# Patient Record
Sex: FEMALE | Race: WHITE | Employment: OTHER | URBAN - METROPOLITAN AREA
[De-identification: names, ages, dates, MRNs, and addresses within clinical notes are randomized per-mention and may not be internally consistent; named-entity substitution may affect disease eponyms.]

---

## 2020-01-29 ENCOUNTER — HOSPITAL ENCOUNTER (OUTPATIENT)
Age: 85
Discharge: HOME | End: 2020-01-31
Payer: MEDICARE

## 2020-01-29 ENCOUNTER — HOSPITAL ENCOUNTER (OUTPATIENT)
Dept: GENERAL RADIOLOGY | Age: 85
Discharge: HOME | End: 2020-01-31
Payer: MEDICARE

## 2020-01-29 PROCEDURE — 72100 X-RAY EXAM L-S SPINE 2/3 VWS: CPT

## 2022-10-17 ENCOUNTER — HOSPITAL ENCOUNTER (OUTPATIENT)
Dept: GENERAL RADIOLOGY | Age: 87
Discharge: HOME | End: 2022-10-19
Payer: MEDICARE

## 2022-10-17 ENCOUNTER — HOSPITAL ENCOUNTER (OUTPATIENT)
Age: 87
Discharge: HOME | End: 2022-10-19
Payer: MEDICARE

## 2022-10-17 DIAGNOSIS — R06.00 DYSPNEA, UNSPECIFIED TYPE: ICD-10-CM

## 2022-10-17 PROCEDURE — 71046 X-RAY EXAM CHEST 2 VIEWS: CPT

## 2023-02-15 LAB
ANION GAP SERPL CALCULATED.3IONS-SCNC: 12 MEQ/L (ref 7–16)
BUN BLDV-MCNC: 25 MG/DL (ref 8–23)
CALCIUM SERPL-MCNC: 9.1 MG/DL (ref 8.5–10.5)
CHLORIDE BLD-SCNC: 98 MEQ/L (ref 95–107)
CO2: 31 MEQ/L (ref 19–31)
CREAT SERPL-MCNC: 1.08 MG/DL (ref 0.6–1.3)
EGFR IF NONAFRICAN AMERICAN: 50 ML/MIN/1.73
GLUCOSE: 96 MG/DL (ref 70–99)
POTASSIUM SERPL-SCNC: 3.6 MEQ/L (ref 3.5–5.4)
SODIUM BLD-SCNC: 141 MEQ/L (ref 133–146)

## 2023-05-10 LAB
ABSOLUTE BASO #: 0.05 K/UL (ref 0–0.2)
ABSOLUTE EOS #: 0.21 K/UL (ref 0–0.5)
ABSOLUTE LYMPH #: 1.37 K/UL (ref 1–4)
ABSOLUTE MONO #: 0.5 K/UL (ref 0.2–1)
ABSOLUTE NEUT #: 4.83 K/UL (ref 1.5–7.5)
ANION GAP SERPL CALCULATED.3IONS-SCNC: 13 MEQ/L (ref 7–16)
BASOPHILS RELATIVE PERCENT: 0.7 %
BUN BLDV-MCNC: 18 MG/DL (ref 8–23)
CALCIUM SERPL-MCNC: 9.6 MG/DL (ref 8.5–10.5)
CHLORIDE BLD-SCNC: 100 MEQ/L (ref 95–107)
CO2: 30 MEQ/L (ref 19–31)
CREAT SERPL-MCNC: 1.18 MG/DL (ref 0.6–1.3)
CREATINE, URINE: 12.7 MG/DL
EGFR IF NONAFRICAN AMERICAN: 45 ML/MIN/1.73
EOSINOPHILS RELATIVE PERCENT: 3 %
GLUCOSE: 99 MG/DL (ref 70–99)
HCT VFR BLD CALC: 40.8 % (ref 34–45)
HEMOGLOBIN: 14 G/DL (ref 11.5–15.5)
LYMPHOCYTE %: 19.6 %
MAGNESIUM: 2.3 MG/DL (ref 1.6–2.6)
MCH RBC QN AUTO: 31.5 PG (ref 25–33)
MCHC RBC AUTO-ENTMCNC: 34.3 G/DL (ref 31–36)
MCV RBC AUTO: 91.7 FL (ref 80–99)
MONOCYTES # BLD: 7.2 %
NEUTROPHILS RELATIVE PERCENT: 69.2 %
PDW BLD-RTO: 12.8 % (ref 11.5–15)
PHOSPHORUS: 3.6 MG/DL (ref 2.5–4.5)
PLATELETS: 243 K/UL (ref 130–400)
PMV BLD AUTO: 10.3 FL (ref 9.3–13)
POTASSIUM SERPL-SCNC: 4 MEQ/L (ref 3.5–5.4)
PROTEIN, URINE: <4 MG/DL
RBC: 4.45 M/UL (ref 3.8–5.4)
SODIUM BLD-SCNC: 143 MEQ/L (ref 133–146)
WBC: 7 K/UL (ref 3.5–11)

## 2023-06-27 ENCOUNTER — TELEPHONE (OUTPATIENT)
Dept: GASTROENTEROLOGY | Age: 88
End: 2023-06-27

## 2023-06-27 RX ORDER — SODIUM, POTASSIUM,MAG SULFATES 17.5-3.13G
1 SOLUTION, RECONSTITUTED, ORAL ORAL ONCE
Qty: 1 EACH | Refills: 0 | Status: SHIPPED | OUTPATIENT
Start: 2023-06-27 | End: 2023-06-27

## 2023-08-14 ENCOUNTER — ANESTHESIA (OUTPATIENT)
Dept: OPERATING ROOM | Age: 88
End: 2023-08-14
Payer: MEDICARE

## 2023-08-14 ENCOUNTER — HOSPITAL ENCOUNTER (OUTPATIENT)
Age: 88
Setting detail: OUTPATIENT SURGERY
Discharge: HOME | End: 2023-08-14
Attending: INTERNAL MEDICINE | Admitting: INTERNAL MEDICINE
Payer: MEDICARE

## 2023-08-14 ENCOUNTER — ANESTHESIA EVENT (OUTPATIENT)
Dept: OPERATING ROOM | Age: 88
End: 2023-08-14
Payer: MEDICARE

## 2023-08-14 VITALS
TEMPERATURE: 96.8 F | HEART RATE: 63 BPM | RESPIRATION RATE: 17 BRPM | BODY MASS INDEX: 32.2 KG/M2 | DIASTOLIC BLOOD PRESSURE: 70 MMHG | WEIGHT: 175 LBS | SYSTOLIC BLOOD PRESSURE: 147 MMHG | OXYGEN SATURATION: 97 % | HEIGHT: 62 IN

## 2023-08-14 DIAGNOSIS — K63.5 POLYP OF COLON, UNSPECIFIED PART OF COLON, UNSPECIFIED TYPE: ICD-10-CM

## 2023-08-14 LAB
GLUCOSE BLD-MCNC: 123 MG/DL (ref 65–105)
GLUCOSE BLD-MCNC: 149 MG/DL (ref 65–105)

## 2023-08-14 PROCEDURE — 3700000000 HC ANESTHESIA ATTENDED CARE: Performed by: INTERNAL MEDICINE

## 2023-08-14 PROCEDURE — 3700000001 HC ADD 15 MINUTES (ANESTHESIA): Performed by: INTERNAL MEDICINE

## 2023-08-14 PROCEDURE — 2500000003 HC RX 250 WO HCPCS: Performed by: NURSE ANESTHETIST, CERTIFIED REGISTERED

## 2023-08-14 PROCEDURE — 3609010300 HC COLONOSCOPY W/BIOPSY SINGLE/MULTIPLE: Performed by: INTERNAL MEDICINE

## 2023-08-14 PROCEDURE — 2709999900 HC NON-CHARGEABLE SUPPLY: Performed by: INTERNAL MEDICINE

## 2023-08-14 PROCEDURE — 82947 ASSAY GLUCOSE BLOOD QUANT: CPT

## 2023-08-14 PROCEDURE — 6360000002 HC RX W HCPCS: Performed by: NURSE ANESTHETIST, CERTIFIED REGISTERED

## 2023-08-14 PROCEDURE — 2580000003 HC RX 258: Performed by: ANESTHESIOLOGY

## 2023-08-14 PROCEDURE — 7100000010 HC PHASE II RECOVERY - FIRST 15 MIN: Performed by: INTERNAL MEDICINE

## 2023-08-14 PROCEDURE — 7100000011 HC PHASE II RECOVERY - ADDTL 15 MIN: Performed by: INTERNAL MEDICINE

## 2023-08-14 PROCEDURE — 88305 TISSUE EXAM BY PATHOLOGIST: CPT

## 2023-08-14 RX ORDER — SODIUM CHLORIDE 0.9 % (FLUSH) 0.9 %
5-40 SYRINGE (ML) INJECTION EVERY 12 HOURS SCHEDULED
Status: CANCELLED | OUTPATIENT
Start: 2023-08-14

## 2023-08-14 RX ORDER — GLIMEPIRIDE 2 MG/1
2 TABLET ORAL DAILY
COMMUNITY
Start: 2023-07-20

## 2023-08-14 RX ORDER — ONDANSETRON 2 MG/ML
4 INJECTION INTRAMUSCULAR; INTRAVENOUS
Status: CANCELLED | OUTPATIENT
Start: 2023-08-14 | End: 2023-08-15

## 2023-08-14 RX ORDER — FENTANYL CITRATE 50 UG/ML
25 INJECTION, SOLUTION INTRAMUSCULAR; INTRAVENOUS EVERY 5 MIN PRN
Status: CANCELLED | OUTPATIENT
Start: 2023-08-14

## 2023-08-14 RX ORDER — LIDOCAINE HYDROCHLORIDE 10 MG/ML
1 INJECTION, SOLUTION EPIDURAL; INFILTRATION; INTRACAUDAL; PERINEURAL
Status: DISCONTINUED | OUTPATIENT
Start: 2023-08-14 | End: 2023-08-14 | Stop reason: HOSPADM

## 2023-08-14 RX ORDER — SODIUM CHLORIDE 9 MG/ML
INJECTION, SOLUTION INTRAVENOUS PRN
Status: CANCELLED | OUTPATIENT
Start: 2023-08-14

## 2023-08-14 RX ORDER — LIDOCAINE HYDROCHLORIDE 20 MG/ML
INJECTION, SOLUTION EPIDURAL; INFILTRATION; INTRACAUDAL; PERINEURAL PRN
Status: DISCONTINUED | OUTPATIENT
Start: 2023-08-14 | End: 2023-08-14 | Stop reason: SDUPTHER

## 2023-08-14 RX ORDER — SODIUM CHLORIDE, SODIUM LACTATE, POTASSIUM CHLORIDE, CALCIUM CHLORIDE 600; 310; 30; 20 MG/100ML; MG/100ML; MG/100ML; MG/100ML
INJECTION, SOLUTION INTRAVENOUS CONTINUOUS
Status: DISCONTINUED | OUTPATIENT
Start: 2023-08-14 | End: 2023-08-14 | Stop reason: HOSPADM

## 2023-08-14 RX ORDER — SODIUM CHLORIDE 0.9 % (FLUSH) 0.9 %
5-40 SYRINGE (ML) INJECTION PRN
Status: CANCELLED | OUTPATIENT
Start: 2023-08-14

## 2023-08-14 RX ORDER — SODIUM CHLORIDE 9 MG/ML
INJECTION, SOLUTION INTRAVENOUS CONTINUOUS
Status: DISCONTINUED | OUTPATIENT
Start: 2023-08-14 | End: 2023-08-14 | Stop reason: HOSPADM

## 2023-08-14 RX ORDER — PROPOFOL 10 MG/ML
INJECTION, EMULSION INTRAVENOUS PRN
Status: DISCONTINUED | OUTPATIENT
Start: 2023-08-14 | End: 2023-08-14 | Stop reason: SDUPTHER

## 2023-08-14 RX ORDER — HYDROMORPHONE HYDROCHLORIDE 1 MG/ML
0.5 INJECTION, SOLUTION INTRAMUSCULAR; INTRAVENOUS; SUBCUTANEOUS EVERY 5 MIN PRN
Status: CANCELLED | OUTPATIENT
Start: 2023-08-14

## 2023-08-14 RX ADMIN — PROPOFOL 30 MG: 10 INJECTION, EMULSION INTRAVENOUS at 09:24

## 2023-08-14 RX ADMIN — LIDOCAINE HYDROCHLORIDE 80 MG: 20 INJECTION, SOLUTION EPIDURAL; INFILTRATION; INTRACAUDAL; PERINEURAL at 09:20

## 2023-08-14 RX ADMIN — SODIUM CHLORIDE, POTASSIUM CHLORIDE, SODIUM LACTATE AND CALCIUM CHLORIDE: 600; 310; 30; 20 INJECTION, SOLUTION INTRAVENOUS at 07:42

## 2023-08-14 RX ADMIN — PROPOFOL 30 MG: 10 INJECTION, EMULSION INTRAVENOUS at 09:29

## 2023-08-14 RX ADMIN — PROPOFOL 20 MG: 10 INJECTION, EMULSION INTRAVENOUS at 09:33

## 2023-08-14 RX ADMIN — PROPOFOL 50 MG: 10 INJECTION, EMULSION INTRAVENOUS at 09:20

## 2023-08-14 RX ADMIN — PROPOFOL 30 MG: 10 INJECTION, EMULSION INTRAVENOUS at 09:27

## 2023-08-14 RX ADMIN — PROPOFOL 20 MG: 10 INJECTION, EMULSION INTRAVENOUS at 09:21

## 2023-08-14 RX ADMIN — PROPOFOL 20 MG: 10 INJECTION, EMULSION INTRAVENOUS at 09:31

## 2023-08-14 ASSESSMENT — PAIN - FUNCTIONAL ASSESSMENT: PAIN_FUNCTIONAL_ASSESSMENT: 0-10

## 2023-08-14 NOTE — H&P
History and Physical Service   [address] [place] [address]     HISTORY AND PHYSICAL EXAMINATION            Date of Evaluation: 8/14/2023  Patient name:  Araseli Iniguez  MRN:   [id number]  YOB: 1935  PCP:    Robert Richey DO    History Obtained From:     Patient, medical records     History of Present Illness: This is Araseli Iniguez a 82 y.o. female who presents today for a COLONOSCOPY DIAGNOSTIC by Jason García MD for Polyp of colon, unspecified part of colon, unspecified type. Patient was referred by Dr Hina Anthony. The patient arrived and completed the prep as directed until watery clear. Patient has history of colon polyps and ultimately ended up with partial colectomy and primary anastomosis 2020. She has never required any chemotherapy. Previous colonoscopy 08/12/2022 at [facility]. No FH colon cancer. Patient denies any bowel changes, bloody tarry stools, diarrhea, abdominal pain or unexplained weight loss. Repots constipation since colon surgery. No history of ulcers, acid reflux/GERD Today no fever, chills, or chest pain. Admits to increased SOB cough, wheezing due to sarcoid - Uses Supplemental oxygen 2 L , per N/C as needed during day and CPA. P at HS      HX of DM, CKD stage III,  sarcoidosis on home oxygen PRN, history for colon polyps and ultimately ended up with partial colectomy and primary anastomosis.   She has never required any chemotherapy       Past Medical History:     Past Medical History:   Diagnosis Date    Anemia 2018    Arthritis     Asthma     Cancer ([address] ) 09/2020    colon cancer    Chronic kidney disease     stage 3    Diabetes mellitus ([facility]) 2001    type 2    Hypertension     Irritable bowel syndrome     Oxygen dependent     as needed    PONV (postoperative nausea and vomiting)     Sarcoidosis     Sleep apnea     CPAP        Past Surgical History:     Past Surgical History:   Procedure Laterality Date    BACK SURGERY  2002 & 2013    COLON

## 2023-08-14 NOTE — OP NOTE
Operative Note      Patient: Suzanne Curiel  YOB: 1935  MRN: [id number]    Date of Procedure: 8/14/2023    Pre-Op Diagnosis Codes:     * Polyp of colon, unspecified part of colon, unspecified type [K63.5]    Post-Op Diagnosis:  Ileocolonic anastomosis, descending colon polyp, post polypectomy scar, sigmoid diverticulosis and internal hemorrhoids. Procedure(s):  COLONOSCOPY WITH BIOPSY    Surgeon(s):  Parul Mcdowell MD    Assistant:   * No surgical staff found *    Anesthesia: Monitor Anesthesia Care    Estimated Blood Loss (mL): Minimal    Complications: None    Specimens:   ID Type Source Tests Collected by Time Destination   A : DESCENDING COLON POLYP  Tissue Colon-Descending SURGICAL PATHOLOGY Parul Mcdowell MD 8/14/2023 0933    B : DESCENDING COLON POLYP SCAR BIOPSY  Tissue Colon-Descending SURGICAL PATHOLOGY Parul Mcdowell MD 8/14/2023 0935        Implants:  * No implants in log *      Drains: * No LDAs found *      Scope withdrawal time: 11 min     Description of Procedure:  Informed consent was obtained from the patient after explanation of the procedure including indications, description of the procedure,  benefits and possible risks and complications of the procedure, and alternatives. Questions were answered. The patient's history was reviewed and a directed physical examination was performed prior to the procedure. Patient was monitored throughout the procedure with pulse oximetry and periodic assessment of vital signs. Patient was sedated as noted above. With the patient initially in the left lateral decubitus position, a digital rectal examination was performed and revealed negative without mass, lesions or tenderness. The Olympus video colonoscope was placed in the patient's rectum and advanced without difficulty  to the ileocolonic anastomosis. The prep was adequate.   Examination of the mucosa was performed during both introduction and withdrawal of the

## 2023-08-14 NOTE — ANESTHESIA POSTPROCEDURE EVALUATION
Department of Anesthesiology  Postprocedure Note    Patient: Blase Ganser  MRN: [id number]  YOB: 1935  Date of evaluation: 8/14/2023      Procedure Summary     Date: 08/14/23 Room / Location: [place] [address] / [facility] - INPATIENT    Anesthesia Start: 4470 Anesthesia Stop: 0945    Procedure: COLONOSCOPY WITH BIOPSY (Rectum) Diagnosis:       Polyp of colon, unspecified part of colon, unspecified type      (Polyp of colon, unspecified part of colon, unspecified type [K63.5])    Surgeons: Mariely Trent MD Responsible Provider: Marianna Romero MD    Anesthesia Type: MAC ASA Status: 4          Anesthesia Type: No value filed.     Winsome Phase I: Winsome Score: 10    Winsome Phase II:        Anesthesia Post Evaluation    Patient location during evaluation: PACU  Patient participation: complete - patient participated  Level of consciousness: awake and alert  Airway patency: patent  Nausea & Vomiting: no nausea and no vomiting  Complications: no  Cardiovascular status: hemodynamically stable  Respiratory status: acceptable  Hydration status: euvolemic  Pain management: adequate

## 2023-08-14 NOTE — DISCHARGE INSTRUCTIONS
[facility]    POST-ENDOSCOPY INSTRUCTIONS    1. ACTIVITY   No driving, operating machinery, or making important decisions for 24 hours. Resume normal activity after 24 hours. You may return to work after 24 hours. 2. DIET        Resume your usual diet unless specified below. ***    3. MEDICATIONS    Resume your usual medications. Do not consume alcohol, tranquilizers, or sleeping medications for 24 hour unless advised by your physician. 4. PHYSICIAN FOLLOW-UP / INSTRUCTIONS    Please call the office/clinic in 10 days for biopsy results:      [  ] GI office:  [phone]          Follow up with your family physician as planned. 6. NORMAL CHANGES YOU MAY EXPERIENCE AFTER ENDOSCOPY:        COLONOSCOPY    Passing of gas for several hours. Some mild abdominal cramping. You may feel fatigued for the next 24-48   hours due to the prep and sedation    7. CALL YOUR PHYSICIAN IF YOU EXPERIENCE ANY OF THE FOLLOWING      A. Passing blood rectally or vomiting blood (color may be red or black)      B. Severe abdominal pain or tenderness (that is not relieved by passing air)      C.   Fever, chills, or excessive sweating      D.  Persistent nausea or vomiting      E.  Redness or swelling at the IV site    If you have additional questions, PLEASE call your doctor or the [phone] [facility] ([phone])

## 2023-08-16 LAB — SURGICAL PATHOLOGY REPORT: NORMAL

## 2023-09-07 ENCOUNTER — OFFICE VISIT (OUTPATIENT)
Dept: SURGERY | Age: 88
End: 2023-09-07

## 2023-09-07 ENCOUNTER — HOSPITAL ENCOUNTER (OUTPATIENT)
Age: 88
Setting detail: SPECIMEN
Discharge: HOME | End: 2023-09-07

## 2023-09-07 VITALS
BODY MASS INDEX: 32.65 KG/M2 | WEIGHT: 177.4 LBS | OXYGEN SATURATION: 97 % | DIASTOLIC BLOOD PRESSURE: 71 MMHG | SYSTOLIC BLOOD PRESSURE: 136 MMHG | HEART RATE: 78 BPM | HEIGHT: 62 IN

## 2023-09-07 DIAGNOSIS — Z85.038 HISTORY OF COLON CANCER: ICD-10-CM

## 2023-09-07 DIAGNOSIS — Z85.038 HISTORY OF COLON CANCER: Primary | ICD-10-CM

## 2023-09-07 DIAGNOSIS — Z85.038 HISTORY OF COLON CANCER IN ADULTHOOD: ICD-10-CM

## 2023-09-07 LAB
BUN SERPL-MCNC: 24 MG/DL (ref 8–23)
CEA SERPL-MCNC: 1.5 NG/ML
CREAT SERPL-MCNC: 1.2 MG/DL (ref 0.5–0.9)
GFR SERPL CREATININE-BSD FRML MDRD: 44 ML/MIN/1.73M2

## 2023-09-07 ASSESSMENT — ENCOUNTER SYMPTOMS
SHORTNESS OF BREATH: 1
COLOR CHANGE: 0
BLOOD IN STOOL: 0
CONSTIPATION: 0
APNEA: 1
RECTAL PAIN: 0
ABDOMINAL PAIN: 1
DIARRHEA: 0
NAUSEA: 0
VOMITING: 0
STRIDOR: 0
CHEST TIGHTNESS: 0
ANAL BLEEDING: 0
ABDOMINAL DISTENTION: 0
COUGH: 1
WHEEZING: 1
BACK PAIN: 0

## 2023-09-07 NOTE — PROGRESS NOTES
Clearance for IV contrast from Dr. Concetta Lyon.
Quit date: 1969     Years since quittin.3    Smokeless tobacco: Never   Vaping Use    Vaping Use: Never used   Substance Use Topics    Alcohol use: Not Currently    Drug use: Never     Current Outpatient Medications   Medication Sig Dispense Refill    glimepiride (AMARYL) 2 MG tablet Take 1 tablet by mouth daily      vitamin C (ASCORBIC ACID) 500 MG tablet Take 1 tablet by mouth daily      losartan (COZAAR) 25 MG tablet Take 0.5 tablets by mouth nightly 45 tablet 3    lovastatin (MEVACOR) 40 MG tablet Take 1 tablet by mouth nightly      furosemide (LASIX) 40 MG tablet Take 1 tablet by mouth daily      sodium chloride nebulizer 0.9 % NEBU 30 mL with albuterol (5 MG/ML) 0.5% NEBU Inhale into the lungs once      brimonidine (ALPHAGAN) 0.2 % ophthalmic solution Place 1 drop into both eyes 2 times daily      levothyroxine (SYNTHROID) 25 MCG tablet Take 2 tablets by mouth Daily      latanoprost (XALATAN) 0.005 % ophthalmic solution 1 drop nightly      Cholecalciferol (VITAMIN D3) 50 MCG (2000 UT) CAPS Take by mouth daily      Probiotic Product (PROBIOTIC-10 PO) Take by mouth 2 times daily       No current facility-administered medications for this visit. Allergies   Allergen Reactions    Latex     Pantoprazole Shortness Of Breath    Sulfamethoxazole-Trimethoprim Shortness Of Breath    Methotrexate Derivatives     Penicillins     Percocet [Oxycodone-Acetaminophen]     Symbicort [Budesonide-Formoterol Fumarate]      Review of Systems   Constitutional:  Negative for activity change, appetite change, chills, diaphoresis, fatigue, fever and unexpected weight change. Respiratory:  Positive for apnea, cough, shortness of breath and wheezing. Negative for chest tightness and stridor. Cardiovascular:  Negative for chest pain and leg swelling. Gastrointestinal:  Positive for abdominal pain.  Negative for abdominal distention, anal bleeding, blood in stool, constipation, diarrhea, nausea, rectal pain and
months, plan for a colonoscopy in 2024. Will discuss future colonoscopies as virtual if necessary    Followup: No follow-ups on file.       Electronically signed by Shola Carlin MD on 9/7/2023 at 1:20 PM

## 2023-10-24 ENCOUNTER — TELEPHONE (OUTPATIENT)
Dept: SURGERY | Age: 88
End: 2023-10-24

## 2023-10-24 NOTE — TELEPHONE ENCOUNTER
Pt was calling to find out if we had sent out her clearance letter to have IV contrast. I explained to patient that I would speak to Medical Assistant and Doctor to see where we were standing and would call her back ASAP.

## 2023-10-25 NOTE — TELEPHONE ENCOUNTER
I see the note for the clearance for IV contrast based on the Creatinine lab result.  Needs to be sent to Dr. Mary Lou Guzman, her nephrologist.  W) [phone]   (V) [phone]

## 2023-10-25 NOTE — TELEPHONE ENCOUNTER
Refaxed Clearance to Dr. Todd Guevara at [phone]. Notified patient that we sent the clearance. Will update patient when clearance is received and CT scan is ordered.

## 2023-11-22 ENCOUNTER — TELEPHONE (OUTPATIENT)
Dept: GASTROENTEROLOGY | Age: 88
End: 2023-11-22

## 2023-11-22 NOTE — TELEPHONE ENCOUNTER
Patient was calling to find out about her Clearance for her CT scan with Contrast . Has doctor's appt around noon so if she can be called before or after 3 pm  is what she's asking for

## 2024-02-15 ENCOUNTER — OFFICE VISIT (OUTPATIENT)
Dept: SURGERY | Age: 89
End: 2024-02-15

## 2024-02-15 ENCOUNTER — HOSPITAL ENCOUNTER (OUTPATIENT)
Age: 89
Setting detail: SPECIMEN
Discharge: HOME | End: 2024-02-15

## 2024-02-15 VITALS
TEMPERATURE: 97.6 F | HEART RATE: 92 BPM | WEIGHT: 180.8 LBS | HEIGHT: 62 IN | DIASTOLIC BLOOD PRESSURE: 74 MMHG | OXYGEN SATURATION: 96 % | SYSTOLIC BLOOD PRESSURE: 136 MMHG | BODY MASS INDEX: 33.27 KG/M2

## 2024-02-15 DIAGNOSIS — Z85.038 HISTORY OF COLON CANCER IN ADULTHOOD: Primary | ICD-10-CM

## 2024-02-15 DIAGNOSIS — Z85.038 HISTORY OF COLON CANCER IN ADULTHOOD: ICD-10-CM

## 2024-02-15 LAB — CEA SERPL-MCNC: 2.2 NG/ML

## 2024-02-15 RX ORDER — BUDESONIDE 0.25 MG/2ML
INHALANT ORAL
COMMUNITY
Start: 2024-01-25

## 2024-02-15 NOTE — PROGRESS NOTES
[facility]  [address] [id number]  Dept: [phone]    Patient:  Selena Epstein  YOB: 1935  Date: 2/15/2024     The patient is a 88 y.o. female who presents today for consult of the following problems:     Chief Complaint: Results (Lab work 9/7/2023/Nephrology consult 11/1/2023/Loose bowels and gas)       HPI:   Patient presents today for a six month colon cancer follow up ,   Surgery: Extended right hemicolectomy   Date of Surgery:09/14/2020 for colon cancer ,   Pathology:T1N0M0 0/17 lymph nodes positive.   CEA was 1.5 on 0/07/2023.  Patient was referred to Dr. Henderson to follow up with him for colonscopy follow up for EMR done in 07/30/2020.   Previous Testing   Colonoscopy (year): 08/14/2023 [facility] follow up colonoscopy.  Tubular adenoma removed.  Other Endoscopy (year): None  CT Scan: 09/28/2022  Other Imaging Studies: None  Other Tests: None     At this time the patient denies any GI related symptoms.  She denies rectal bleeding, unexplained weight loss, loss of appetite etc.  History:     Past Medical History:   Diagnosis Date    Anemia 2018    Arthritis     Asthma     Cancer (HCC) 09/2020    colon cancer    Chronic kidney disease     stage 3    Diabetes mellitus (HCC) 2001    type 2    Hypertension     Irritable bowel syndrome     Oxygen dependent     as needed    PONV (postoperative nausea and vomiting)     Sarcoidosis     Sleep apnea     CPAP     Past Surgical History:   Procedure Laterality Date    BACK SURGERY  2002 & 2013    COLON SURGERY  09/2020    removed 2/3 of colon d/t cancer    COLONOSCOPY      COLONOSCOPY N/A 8/14/2023    COLONOSCOPY WITH BIOPSY performed by Girma Henderson MD at [facility] OR    EYE SURGERY Bilateral     cataracts    OTHER SURGICAL HISTORY      biopsy to diagnose sarcoidosis    TONSILLECTOMY AND ADENOIDECTOMY  1945     Family History   Problem

## 2024-03-27 ENCOUNTER — HOSPITAL ENCOUNTER (OUTPATIENT)
Age: 89
Setting detail: OUTPATIENT SURGERY
Discharge: HOME | End: 2024-03-27
Attending: INTERNAL MEDICINE | Admitting: INTERNAL MEDICINE
Payer: MEDICARE

## 2024-03-27 ENCOUNTER — ANESTHESIA (OUTPATIENT)
Dept: OPERATING ROOM | Age: 89
End: 2024-03-27
Payer: MEDICARE

## 2024-03-27 ENCOUNTER — ANESTHESIA EVENT (OUTPATIENT)
Dept: OPERATING ROOM | Age: 89
End: 2024-03-27
Payer: MEDICARE

## 2024-03-27 VITALS
BODY MASS INDEX: 32.77 KG/M2 | WEIGHT: 178.1 LBS | HEIGHT: 62 IN | SYSTOLIC BLOOD PRESSURE: 143 MMHG | RESPIRATION RATE: 26 BRPM | OXYGEN SATURATION: 97 % | DIASTOLIC BLOOD PRESSURE: 75 MMHG | TEMPERATURE: 97.3 F | HEART RATE: 74 BPM

## 2024-03-27 DIAGNOSIS — R10.13 EPIGASTRIC PAIN: ICD-10-CM

## 2024-03-27 DIAGNOSIS — R13.19 OTHER DYSPHAGIA: ICD-10-CM

## 2024-03-27 LAB
GLUCOSE BLD-MCNC: 135 MG/DL (ref 65–105)
GLUCOSE BLD-MCNC: 150 MG/DL (ref 65–105)

## 2024-03-27 PROCEDURE — 3609012400 HC EGD TRANSORAL BIOPSY SINGLE/MULTIPLE: Performed by: INTERNAL MEDICINE

## 2024-03-27 PROCEDURE — 88305 TISSUE EXAM BY PATHOLOGIST: CPT

## 2024-03-27 PROCEDURE — 2500000003 HC RX 250 WO HCPCS: Performed by: NURSE ANESTHETIST, CERTIFIED REGISTERED

## 2024-03-27 PROCEDURE — 6370000000 HC RX 637 (ALT 250 FOR IP): Performed by: ANESTHESIOLOGY

## 2024-03-27 PROCEDURE — 7100000011 HC PHASE II RECOVERY - ADDTL 15 MIN: Performed by: INTERNAL MEDICINE

## 2024-03-27 PROCEDURE — 3700000000 HC ANESTHESIA ATTENDED CARE: Performed by: INTERNAL MEDICINE

## 2024-03-27 PROCEDURE — 2709999900 HC NON-CHARGEABLE SUPPLY: Performed by: INTERNAL MEDICINE

## 2024-03-27 PROCEDURE — 7100000010 HC PHASE II RECOVERY - FIRST 15 MIN: Performed by: INTERNAL MEDICINE

## 2024-03-27 PROCEDURE — 2580000003 HC RX 258: Performed by: ANESTHESIOLOGY

## 2024-03-27 PROCEDURE — 6360000002 HC RX W HCPCS: Performed by: NURSE ANESTHETIST, CERTIFIED REGISTERED

## 2024-03-27 PROCEDURE — 82947 ASSAY GLUCOSE BLOOD QUANT: CPT

## 2024-03-27 RX ORDER — SODIUM CHLORIDE 0.9 % (FLUSH) 0.9 %
5-40 SYRINGE (ML) INJECTION EVERY 12 HOURS SCHEDULED
Status: CANCELLED | OUTPATIENT
Start: 2024-03-27

## 2024-03-27 RX ORDER — SODIUM CHLORIDE 0.9 % (FLUSH) 0.9 %
5-40 SYRINGE (ML) INJECTION PRN
Status: DISCONTINUED | OUTPATIENT
Start: 2024-03-27 | End: 2024-03-27 | Stop reason: HOSPADM

## 2024-03-27 RX ORDER — SODIUM CHLORIDE 0.9 % (FLUSH) 0.9 %
5-40 SYRINGE (ML) INJECTION EVERY 12 HOURS SCHEDULED
Status: DISCONTINUED | OUTPATIENT
Start: 2024-03-27 | End: 2024-03-27 | Stop reason: HOSPADM

## 2024-03-27 RX ORDER — GLYCOPYRROLATE 0.2 MG/ML
INJECTION INTRAMUSCULAR; INTRAVENOUS PRN
Status: DISCONTINUED | OUTPATIENT
Start: 2024-03-27 | End: 2024-03-27 | Stop reason: SDUPTHER

## 2024-03-27 RX ORDER — SODIUM CHLORIDE 9 MG/ML
INJECTION, SOLUTION INTRAVENOUS PRN
Status: DISCONTINUED | OUTPATIENT
Start: 2024-03-27 | End: 2024-03-27 | Stop reason: HOSPADM

## 2024-03-27 RX ORDER — PROPOFOL 10 MG/ML
INJECTION, EMULSION INTRAVENOUS PRN
Status: DISCONTINUED | OUTPATIENT
Start: 2024-03-27 | End: 2024-03-27 | Stop reason: SDUPTHER

## 2024-03-27 RX ORDER — SODIUM CHLORIDE 9 MG/ML
INJECTION, SOLUTION INTRAVENOUS CONTINUOUS
Status: DISCONTINUED | OUTPATIENT
Start: 2024-03-27 | End: 2024-03-27 | Stop reason: HOSPADM

## 2024-03-27 RX ORDER — HYDROMORPHONE HYDROCHLORIDE 1 MG/ML
0.5 INJECTION, SOLUTION INTRAMUSCULAR; INTRAVENOUS; SUBCUTANEOUS EVERY 5 MIN PRN
Status: CANCELLED | OUTPATIENT
Start: 2024-03-27

## 2024-03-27 RX ORDER — SODIUM CHLORIDE 9 MG/ML
INJECTION, SOLUTION INTRAVENOUS PRN
Status: CANCELLED | OUTPATIENT
Start: 2024-03-27

## 2024-03-27 RX ORDER — LIDOCAINE HYDROCHLORIDE 10 MG/ML
1 INJECTION, SOLUTION EPIDURAL; INFILTRATION; INTRACAUDAL; PERINEURAL
Status: DISCONTINUED | OUTPATIENT
Start: 2024-03-28 | End: 2024-03-27 | Stop reason: HOSPADM

## 2024-03-27 RX ORDER — NALOXONE HYDROCHLORIDE 0.4 MG/ML
INJECTION, SOLUTION INTRAMUSCULAR; INTRAVENOUS; SUBCUTANEOUS PRN
Status: CANCELLED | OUTPATIENT
Start: 2024-03-27

## 2024-03-27 RX ORDER — FENTANYL CITRATE 50 UG/ML
25 INJECTION, SOLUTION INTRAMUSCULAR; INTRAVENOUS EVERY 5 MIN PRN
Status: CANCELLED | OUTPATIENT
Start: 2024-03-27

## 2024-03-27 RX ORDER — SODIUM CHLORIDE, SODIUM LACTATE, POTASSIUM CHLORIDE, CALCIUM CHLORIDE 600; 310; 30; 20 MG/100ML; MG/100ML; MG/100ML; MG/100ML
INJECTION, SOLUTION INTRAVENOUS CONTINUOUS
Status: DISCONTINUED | OUTPATIENT
Start: 2024-03-27 | End: 2024-03-27 | Stop reason: HOSPADM

## 2024-03-27 RX ORDER — LIDOCAINE HYDROCHLORIDE 20 MG/ML
INJECTION, SOLUTION EPIDURAL; INFILTRATION; INTRACAUDAL; PERINEURAL PRN
Status: DISCONTINUED | OUTPATIENT
Start: 2024-03-27 | End: 2024-03-27 | Stop reason: SDUPTHER

## 2024-03-27 RX ORDER — ONDANSETRON 2 MG/ML
4 INJECTION INTRAMUSCULAR; INTRAVENOUS
Status: CANCELLED | OUTPATIENT
Start: 2024-03-27 | End: 2024-03-28

## 2024-03-27 RX ORDER — SODIUM CHLORIDE 0.9 % (FLUSH) 0.9 %
5-40 SYRINGE (ML) INJECTION PRN
Status: CANCELLED | OUTPATIENT
Start: 2024-03-27

## 2024-03-27 RX ORDER — IPRATROPIUM BROMIDE AND ALBUTEROL SULFATE 2.5; .5 MG/3ML; MG/3ML
1 SOLUTION RESPIRATORY (INHALATION) ONCE
Status: COMPLETED | OUTPATIENT
Start: 2024-03-27 | End: 2024-03-27

## 2024-03-27 RX ADMIN — SODIUM CHLORIDE, POTASSIUM CHLORIDE, SODIUM LACTATE AND CALCIUM CHLORIDE: 600; 310; 30; 20 INJECTION, SOLUTION INTRAVENOUS at 09:47

## 2024-03-27 RX ADMIN — PROPOFOL 20 MG: 10 INJECTION, EMULSION INTRAVENOUS at 11:25

## 2024-03-27 RX ADMIN — GLYCOPYRROLATE 0.1 MG: 0.2 INJECTION INTRAMUSCULAR; INTRAVENOUS at 11:19

## 2024-03-27 RX ADMIN — PROPOFOL 30 MG: 10 INJECTION, EMULSION INTRAVENOUS at 11:29

## 2024-03-27 RX ADMIN — LIDOCAINE HYDROCHLORIDE 60 MG: 20 INJECTION, SOLUTION EPIDURAL; INFILTRATION; INTRACAUDAL; PERINEURAL at 11:20

## 2024-03-27 RX ADMIN — IPRATROPIUM BROMIDE AND ALBUTEROL SULFATE 1 DOSE: .5; 2.5 SOLUTION RESPIRATORY (INHALATION) at 09:51

## 2024-03-27 RX ADMIN — PROPOFOL 50 MG: 10 INJECTION, EMULSION INTRAVENOUS at 11:21

## 2024-03-27 ASSESSMENT — PAIN - FUNCTIONAL ASSESSMENT
PAIN_FUNCTIONAL_ASSESSMENT: NONE - DENIES PAIN
PAIN_FUNCTIONAL_ASSESSMENT: 0-10

## 2024-03-27 ASSESSMENT — COPD QUESTIONNAIRES: CAT_SEVERITY: SEVERE

## 2024-03-27 NOTE — H&P
History and Physical Service   [facility]    HISTORY AND PHYSICAL EXAMINATION            Date of Evaluation: 3/27/2024  Patient name:  Selena Epstein  MRN:   [id number]  YOB: 1935  PCP:    Jazmine Xiong DO    History Obtained From:     Patient    History of Present Illness:     This is Selena Epstein a 88 y.o. female who presents today for an ESOPHAGOGASTRODUODENOSCOPY by Deejay Quiros DO for Epigastric pain; Other dysphagia. Patient has flatulence, loose stool, bloating, upper abdominal pain, nausea, and infrequent dysphagia with fluid and food. The upper abdominal pain occasionally wakes the pt up from sleep. Pt denies choking, black tarry stools, constipation, vomiting, fever, chills, night sweats, and unexplained weight loss. Pt states 2/3 of her colon was removed in 2020 due to colon cancer. Known IBS. Pt states she was told in the past that she has a hiatal hernia. Pt denies history of ulcers and acid reflux/GERD. Known diabetes. POC blood glucose today is 150 mg/dL. Vitamin C was last taken on 3/26/2024.     Past Medical History:     Past Medical History:   Diagnosis Date    Anemia 2018    Arthritis     Asthma     Cancer (HCC) 09/2020    colon cancer    Chronic kidney disease     stage 3    COVID 2020    on oxygen since covid    Diabetes mellitus (HCC) 2001    type 2    Hypertension     Irritable bowel syndrome     Oxygen dependent     as needed    PONV (postoperative nausea and vomiting)     Sarcoidosis     Sleep apnea     CPAP    Umbilical hernia         Past Surgical History:     Past Surgical History:   Procedure Laterality Date    BACK SURGERY  2002 & 2013    COLON SURGERY  09/2020    removed 2/3 of colon d/t cancer    COLONOSCOPY      COLONOSCOPY N/A 8/14/2023    COLONOSCOPY WITH BIOPSY performed by Girma Henderson MD at [facility] OR    EYE SURGERY Bilateral     cataracts    OTHER SURGICAL HISTORY      biopsy to diagnose sarcoidosis    TONSILLECTOMY AND  ADENOIDECTOMY  1945        Medications Prior to Admission:     Prior to Admission medications    Medication Sig Start Date End Date Taking? Authorizing Provider   budesonide (PULMICORT) 0.25 MG/2ML nebulizer suspension  1/25/24   Perry Chatterjee MD   albuterol (PROVENTIL) 2.5 MG/0.5ML NEBU nebulizer solution Take 0.5 mLs by nebulization every 6 hours as needed for Wheezing    Perry Chatterjee MD   glimepiride (AMARYL) 2 MG tablet Take 1 tablet by mouth daily 7/20/23   Perry Chatterjee MD   vitamin C (ASCORBIC ACID) 500 MG tablet Take 1 tablet by mouth daily    Perry Chatterjee MD   losartan (COZAAR) 25 MG tablet Take 0.5 tablets by mouth nightly  Patient not taking: Reported on 3/27/2024 11/15/22   Rustam Coronado MD   lovastatin (MEVACOR) 40 MG tablet Take 1 tablet by mouth nightly    Perry Chatterjee MD   furosemide (LASIX) 40 MG tablet Take 1 tablet by mouth daily    Perry Chatterjee MD   brimonidine (ALPHAGAN) 0.2 % ophthalmic solution Place 1 drop into both eyes 2 times daily    Perry Chatterjee MD   levothyroxine (SYNTHROID) 25 MCG tablet Take 2 tablets by mouth Daily    Perry Chatterjee MD   latanoprost (XALATAN) 0.005 % ophthalmic solution 1 drop nightly    Perry Chatterjee MD   Probiotic Product (PROBIOTIC-10 PO) Take by mouth 2 times daily    Perry Chatterjee MD        Allergies:     Latex, Pantoprazole, Sulfamethoxazole-trimethoprim, Methotrexate derivatives, Penicillins, Percocet [oxycodone-acetaminophen], and Symbicort [budesonide-formoterol fumarate]    Social History:     Tobacco:    reports that she quit smoking about 54 years ago. Her smoking use included cigarettes. She has never used smokeless tobacco.  Alcohol:      reports that she does not currently use alcohol.  Drug Use:  reports no history of drug use.    Family History:     Family History   Problem Relation Age of Onset    Cancer Mother     Heart Attack Father        Review of Systems:

## 2024-03-27 NOTE — OP NOTE
Patient: Selena Epstein  YOB: 1935  MRN: [id number]    Date of Procedure: 3/27/2024    Pre-Op Diagnosis Codes:     * Epigastric pain [R10.13]     * Other dysphagia [R13.19]    Post-Op Diagnosis: Normal exam       Procedure(s):  ESOPHAGOGASTRODUODENOSCOPY BIOPSY    Surgeon(s):  Deejay Blanco DO    Assistant:   * No surgical staff found *    Anesthesia: Monitor Anesthesia Care    Estimated Blood Loss (mL): Minimal    Complications: None    Specimens:   ID Type Source Tests Collected by Time Destination   A : DUODENAL BX'S Tissue Duodenum SURGICAL PATHOLOGY Deejay Blanco,  3/27/2024 1126    B : GASTRIC BX Tissue Stomach SURGICAL PATHOLOGY Deejay Blanco,  3/27/2024 1128        Implants:  * No implants in log *      Drains: * No LDAs found *    Findings: See below    Detailed Description of Procedure: Informed consent was obtained from the patient after explanation of the procedure including indications, description of the procedure,  benefits and possible risks and complications of the procedure, and alternatives. Questions were answered.  The patient's history was reviewed and a directed physical examination was performed prior to the procedure.    Patient was monitored throughout the procedure with pulse oximetry and periodic assessment of vital signs. Patient was sedated as noted above. With the patient in the left lateral decubitus position, the Olympus videoendoscope was placed in the patient's mouth and under direct visualization passed into the esophagus.  Visualization of the esophagus, stomach, and duodenum was performed during both introduction and withdrawal of the endoscope and retroflexed view of the proximal stomach was obtained. The scope was passed to the 2nd portion of the duodenum.  The patient tolerated the procedure well and was taken to the recovery area in good condition.    Findings::   Esophagus: normal except for mid esophageal blebs  Stomach: Normal.  Biopsied  Duodenum:  Normal.  Biopsied    Recommendations: -Await pathology.      Electronically signed by Deejay Blanco DO on 3/27/2024 at 11:31 AM

## 2024-03-27 NOTE — ANESTHESIA PRE PROCEDURE
Department of Anesthesiology  Preprocedure Note       Name:  Selena Epstein   Age:  88 y.o.  :  1935                                          MRN:  [id number]         Date:  3/27/2024      Surgeon: Surgeon(s):  Deejay Blanco DO    Procedure: Procedure(s):  ESOPHAGOGASTRODUODENOSCOPY    Medications prior to admission:   Prior to Admission medications    Medication Sig Start Date End Date Taking? Authorizing Provider   budesonide (PULMICORT) 0.25 MG/2ML nebulizer suspension  24   Perry Chatterjee MD   albuterol (PROVENTIL) 2.5 MG/0.5ML NEBU nebulizer solution Take 0.5 mLs by nebulization every 6 hours as needed for Wheezing    Perry Chatterjee MD   glimepiride (AMARYL) 2 MG tablet Take 1 tablet by mouth daily 23   Perry Chatterjee MD   vitamin C (ASCORBIC ACID) 500 MG tablet Take 1 tablet by mouth daily    Perry Chatterjee MD   losartan (COZAAR) 25 MG tablet Take 0.5 tablets by mouth nightly 11/15/22   Rustam Coronado MD   lovastatin (MEVACOR) 40 MG tablet Take 1 tablet by mouth nightly    Perry Chatterjee MD   furosemide (LASIX) 40 MG tablet Take 1 tablet by mouth daily    Perry Chatterjee MD   brimonidine (ALPHAGAN) 0.2 % ophthalmic solution Place 1 drop into both eyes 2 times daily    Perry Chatterjee MD   levothyroxine (SYNTHROID) 25 MCG tablet Take 2 tablets by mouth Daily    Perry Chatterjee MD   latanoprost (XALATAN) 0.005 % ophthalmic solution 1 drop nightly    Perry Chatterjee MD   Cholecalciferol (VITAMIN D3) 50 MCG ( UT) CAPS Take by mouth daily    Perry Chatterjee MD   Probiotic Product (PROBIOTIC-10 PO) Take by mouth 2 times daily    Perry Chatterjee MD       Current medications:    No current facility-administered medications for this encounter.       Allergies:    Allergies   Allergen Reactions    Latex     Pantoprazole Shortness Of Breath    Sulfamethoxazole-Trimethoprim Shortness Of Breath    Methotrexate Derivatives

## 2024-03-27 NOTE — ANESTHESIA POSTPROCEDURE EVALUATION
Department of Anesthesiology  Postprocedure Note    Patient: Selena Epstein  MRN: [id number]  YOB: 1935  Date of evaluation: 3/27/2024    Procedure Summary       Date: 03/27/24 Room / Location: [address]    Anesthesia Start: 1117 Anesthesia Stop: 1135    Procedure: ESOPHAGOGASTRODUODENOSCOPY BIOPSY (Mouth) Diagnosis:       Epigastric pain      Other dysphagia      (Epigastric pain [R10.13])      (Other dysphagia [R13.19])    Surgeons: Deejay Blanco DO Responsible Provider: Houston Osullivan MD    Anesthesia Type: MAC, general, TIVA ASA Status: 4            Anesthesia Type: No value filed.    Winsome Phase I:      Winsome Phase II: Winsome Score: 9    Anesthesia Post Evaluation    Patient location during evaluation: PACU  Patient participation: complete - patient participated  Level of consciousness: awake  Airway patency: patent  Nausea & Vomiting: no nausea  Cardiovascular status: blood pressure returned to baseline  Respiratory status: acceptable  Hydration status: euvolemic  Comments: Multimodal analgesia pain management as indicated by procedure  Multimodal analgesia pain management approach  Pain management: adequate    No notable events documented.

## 2024-03-27 NOTE — DISCHARGE INSTRUCTIONS
POST-ENDOSCOPY INSTRUCTIONS:    1. ACTIVITY   No driving, operating machinery, or making important decisions for 24 hours.    Resume normal activity after 24 hours.  You may return to work after 24 hours.    2. DIET     ___x_ (EGD/ERCP):  Do not eat or drink for one hour after your exam.  You may then   try a sip of water, and if you are able to swallow as usual you may advance to a   regular diet.     ____ (Colonscopy/Flex Sig):  Resume your usual diet unless specified below.       ____ Diet Modification:    3. MEDICATIONS (Do not consume alcohol, tranquilizers, or sleeping medications for 24           hours unless advised by your physician)       ___x__ Resume your usual medications    4. PHYSICIAN FOLLOW-UP  x__ Please call the office for an appointment/further instructions.          ____ See your family physician.    5. ADDITIONAL INSTRUCTIONS          6. NORMAL CHANGES YOU MAY EXPERIENCE AFTER ENDOSCOPY:          EGD/ERCP     COLONOSCOPY      Sore throat after EGD/ERCP   Passing of gas for several hours after      A bloated feeling and belching from  Some mild abdominal cramping   air in stomach    If a biopsy/polypectomy was done, you      If a biopsy was done, you may spit   may see some spotting of blood   up some blood tinged mucous  You may feel fatigued for the next 24-48         hours due to the prep and sedation    7. CALL YOUR PHYSICIAN IF YOU EXPERIENCE ANY OF THE FOLLOWING:      A.  Passing blood rectally or vomiting blood (color may be red or black)      B.  Severe abdominal pain or tenderness (that is not relieved by passing air)      C.  Fever, chills, or excessive sweating      D.  Persistent nausea or vomiting      E.  Redness or swelling at the IV site      Upper GI Endoscopy: What to Expect at Home  Your Recovery  You may have a sore throat for a day or two after the test.  How can you care for yourself at home?  Activity  Rest as much as you need to after you go home.  You should be able

## 2024-03-29 ENCOUNTER — HOSPITAL ENCOUNTER (OUTPATIENT)
Dept: CT IMAGING | Age: 89
End: 2024-03-29
Attending: COLON & RECTAL SURGERY
Payer: MEDICARE

## 2024-03-29 DIAGNOSIS — Z85.038 HISTORY OF COLON CANCER IN ADULTHOOD: ICD-10-CM

## 2024-03-29 PROCEDURE — 74176 CT ABD & PELVIS W/O CONTRAST: CPT

## 2024-03-30 LAB — SURGICAL PATHOLOGY REPORT: NORMAL

## 2024-04-04 NOTE — RESULT ENCOUNTER NOTE
Called patient and informed of results.  Patient verbalized understanding. Patient requested report be sent to her PCP Dr. Xiong and her lung specialist Dr. Matthews at [facility]. Writer will send report as requested.

## 2024-06-28 ENCOUNTER — HOSPITAL ENCOUNTER (EMERGENCY)
Age: 89
Discharge: HOME | End: 2024-06-29
Attending: STUDENT IN AN ORGANIZED HEALTH CARE EDUCATION/TRAINING PROGRAM
Payer: MEDICARE

## 2024-06-28 ENCOUNTER — APPOINTMENT (OUTPATIENT)
Dept: CT IMAGING | Age: 89
End: 2024-06-28
Payer: MEDICARE

## 2024-06-28 VITALS
OXYGEN SATURATION: 100 % | TEMPERATURE: 97.9 F | RESPIRATION RATE: 18 BRPM | BODY MASS INDEX: 32.56 KG/M2 | SYSTOLIC BLOOD PRESSURE: 113 MMHG | DIASTOLIC BLOOD PRESSURE: 62 MMHG | WEIGHT: 178 LBS | HEART RATE: 76 BPM

## 2024-06-28 DIAGNOSIS — L03.116 CELLULITIS OF LEFT LOWER EXTREMITY: Primary | ICD-10-CM

## 2024-06-28 LAB
BASOPHILS # BLD: 0.1 K/UL (ref 0–0.2)
BASOPHILS NFR BLD: 1 % (ref 0–2)
EOSINOPHIL # BLD: 0.2 K/UL (ref 0–0.4)
EOSINOPHILS RELATIVE PERCENT: 2 % (ref 1–4)
ERYTHROCYTE [DISTWIDTH] IN BLOOD BY AUTOMATED COUNT: 14 % (ref 12.5–15.4)
ERYTHROCYTE [SEDIMENTATION RATE] IN BLOOD BY PHOTOMETRIC METHOD: 20 MM/HR (ref 0–30)
HCT VFR BLD AUTO: 40.6 % (ref 36–46)
HGB BLD-MCNC: 13.9 G/DL (ref 12–16)
LYMPHOCYTES NFR BLD: 1.2 K/UL (ref 1–4.8)
LYMPHOCYTES RELATIVE PERCENT: 17 % (ref 24–44)
MCH RBC QN AUTO: 31.7 PG (ref 26–34)
MCHC RBC AUTO-ENTMCNC: 34.3 G/DL (ref 31–37)
MCV RBC AUTO: 92.4 FL (ref 80–100)
MONOCYTES NFR BLD: 0.6 K/UL (ref 0.1–1.2)
MONOCYTES NFR BLD: 9 % (ref 2–11)
NEUTROPHILS NFR BLD: 71 % (ref 36–66)
NEUTS SEG NFR BLD: 5 K/UL (ref 1.8–7.7)
PLATELET # BLD AUTO: 215 K/UL (ref 140–450)
PMV BLD AUTO: 8.3 FL (ref 6–12)
RBC # BLD AUTO: 4.39 M/UL (ref 4–5.2)
WBC OTHER # BLD: 7 K/UL (ref 3.5–11)

## 2024-06-28 PROCEDURE — 87070 CULTURE OTHR SPECIMN AEROBIC: CPT

## 2024-06-28 PROCEDURE — 85025 COMPLETE CBC W/AUTO DIFF WBC: CPT

## 2024-06-28 PROCEDURE — 86140 C-REACTIVE PROTEIN: CPT

## 2024-06-28 PROCEDURE — 85652 RBC SED RATE AUTOMATED: CPT

## 2024-06-28 PROCEDURE — 72131 CT LUMBAR SPINE W/O DYE: CPT

## 2024-06-28 PROCEDURE — 99284 EMERGENCY DEPT VISIT MOD MDM: CPT

## 2024-06-28 PROCEDURE — 87075 CULTR BACTERIA EXCEPT BLOOD: CPT

## 2024-06-28 PROCEDURE — 89051 BODY FLUID CELL COUNT: CPT

## 2024-06-28 PROCEDURE — 87205 SMEAR GRAM STAIN: CPT

## 2024-06-28 PROCEDURE — 20610 DRAIN/INJ JOINT/BURSA W/O US: CPT

## 2024-06-28 PROCEDURE — 80048 BASIC METABOLIC PNL TOTAL CA: CPT

## 2024-06-28 RX ORDER — ACETAMINOPHEN 500 MG
1000 TABLET ORAL ONCE
Status: COMPLETED | OUTPATIENT
Start: 2024-06-28 | End: 2024-06-29

## 2024-06-28 ASSESSMENT — PAIN SCALES - GENERAL: PAINLEVEL_OUTOF10: 5

## 2024-06-28 ASSESSMENT — PAIN DESCRIPTION - LOCATION: LOCATION: BACK;KNEE

## 2024-06-28 ASSESSMENT — PAIN - FUNCTIONAL ASSESSMENT: PAIN_FUNCTIONAL_ASSESSMENT: 0-10

## 2024-06-29 LAB
ANION GAP SERPL CALCULATED.3IONS-SCNC: 11 MMOL/L (ref 9–17)
APPEARANCE FLD: NORMAL
BODY FLD TYPE: NORMAL
BUN SERPL-MCNC: 23 MG/DL (ref 8–23)
CALCIUM SERPL-MCNC: 9.2 MG/DL (ref 8.6–10.4)
CHLORIDE SERPL-SCNC: 103 MMOL/L (ref 98–107)
CLOT CHECK: NORMAL
CO2 SERPL-SCNC: 28 MMOL/L (ref 20–31)
COLOR FLD: NORMAL
CREAT SERPL-MCNC: 1.1 MG/DL (ref 0.5–0.9)
CRP SERPL HS-MCNC: 6.3 MG/L (ref 0–5)
GFR, ESTIMATED: 49 ML/MIN/1.73M2
GLUCOSE SERPL-MCNC: 108 MG/DL (ref 70–99)
LYMPHOCYTES NFR FLD: 36 %
MANUAL DIF COMMENT FLD-IMP: NORMAL
MONOCYTES NFR FLD: 13 %
NEUTROPHILS NFR FLD: 51 %
NUC CELL # FLD: 448 CELLS/UL
POTASSIUM SERPL-SCNC: 4.6 MMOL/L (ref 3.7–5.3)
RBC # FLD: NORMAL CELLS/UL
SODIUM SERPL-SCNC: 142 MMOL/L (ref 135–144)

## 2024-06-29 PROCEDURE — 6370000000 HC RX 637 (ALT 250 FOR IP): Performed by: STUDENT IN AN ORGANIZED HEALTH CARE EDUCATION/TRAINING PROGRAM

## 2024-06-29 PROCEDURE — 6370000000 HC RX 637 (ALT 250 FOR IP)

## 2024-06-29 RX ORDER — DOXYCYCLINE HYCLATE 100 MG
100 TABLET ORAL 2 TIMES DAILY
Qty: 20 TABLET | Refills: 0 | Status: SHIPPED | OUTPATIENT
Start: 2024-06-29 | End: 2024-07-09

## 2024-06-29 RX ORDER — DOXYCYCLINE HYCLATE 100 MG
100 TABLET ORAL ONCE
Status: COMPLETED | OUTPATIENT
Start: 2024-06-29 | End: 2024-06-29

## 2024-06-29 RX ORDER — ACETAMINOPHEN 500 MG
TABLET ORAL
Status: COMPLETED
Start: 2024-06-29 | End: 2024-06-29

## 2024-06-29 RX ADMIN — ACETAMINOPHEN 1000 MG: 500 TABLET ORAL at 01:19

## 2024-06-29 RX ADMIN — DOXYCYCLINE HYCLATE 100 MG: 100 TABLET, COATED ORAL at 01:19

## 2024-06-29 ASSESSMENT — ENCOUNTER SYMPTOMS
SORE THROAT: 0
NAUSEA: 0
BACK PAIN: 1
COUGH: 0
BLOOD IN STOOL: 0
FACIAL SWELLING: 0
WHEEZING: 0
VOMITING: 0
SHORTNESS OF BREATH: 0
ABDOMINAL PAIN: 0
DIARRHEA: 0

## 2024-06-29 NOTE — ED PROVIDER NOTES
[facility] EMERGENCY DEPARTMENT  EMERGENCY DEPARTMENT ENCOUNTER      Pt Name: Selena Epstein  MRN: [id number]  Birthdate 1935  Date of evaluation: 6/28/2024  Provider: Jose Enrique Harris DO    CHIEF COMPLAINT       Chief Complaint   Patient presents with    Back Pain     Reports mid back pain, started 2-3 weeks ago, does report fall 6 weeks ago, unsure if related, saw PCP, reports outpt CT ordered, has not done yet, reports taking tylenol for pain     Knee Pain     Reports left knee pain, has seen chiropractor, ortho and PCP, reports xrays negative          HISTORY OF PRESENT ILLNESS   (Location/Symptom, Timing/Onset, Context/Setting, Quality, Duration, Modifying Factors, Severity)  Note limiting factors.   Selena Epstein is a 88 y.o. female who presents to the emergency department with left knee pain.  Patient states that for the past 2 weeks she has been having left knee pain that is worse with moving and ambulation that radiates up her leg.  She notes that she had a fall onto her bottom about 6 weeks ago and therefore her doctor thought it may be related to low back trauma and wants her to have a CT scan of her lumbar spine.  However, she notes that she has been seeing her orthopedist for knee injections and over the course of a few weeks has had 2 injections in the left knee.  She also notes that there is a red rash around the injection site with warmth and swelling to the knee.  Denies any measured fevers.    HPI    Nursing Notes were reviewed.    REVIEW OF SYSTEMS    (2-9 systems for level 4, 10 or more for level 5)     Review of Systems   Constitutional:  Negative for chills and fever.   HENT:  Negative for congestion, facial swelling and sore throat.    Eyes:  Negative for visual disturbance.   Respiratory:  Negative for cough, shortness of breath and wheezing.    Cardiovascular:  Negative for chest pain, palpitations and leg swelling.   Gastrointestinal:  Negative for abdominal pain, blood in

## 2024-06-29 NOTE — DISCHARGE INSTRUCTIONS
Please take antibiotics as prescribed all the way through until they are finished and call your PCP on Monday morning to discuss results of fluid analysis of the knee as well as schedule follow-up appointment.  If you develop fevers or any other concerns please return to the emergency department.

## 2024-06-30 LAB
MICROORGANISM SPEC CULT: NORMAL
MICROORGANISM/AGENT SPEC: NORMAL
MICROORGANISM/AGENT SPEC: NORMAL
SPECIMEN DESCRIPTION: NORMAL

## 2024-08-16 ENCOUNTER — OFFICE VISIT (OUTPATIENT)
Dept: SURGERY | Age: 89
End: 2024-08-16
Payer: MEDICARE

## 2024-08-16 VITALS
SYSTOLIC BLOOD PRESSURE: 135 MMHG | OXYGEN SATURATION: 95 % | HEIGHT: 62 IN | DIASTOLIC BLOOD PRESSURE: 80 MMHG | WEIGHT: 180 LBS | HEART RATE: 83 BPM | RESPIRATION RATE: 18 BRPM | BODY MASS INDEX: 33.13 KG/M2

## 2024-08-16 DIAGNOSIS — Z85.038 HISTORY OF COLON CANCER IN ADULTHOOD: Primary | ICD-10-CM

## 2024-08-16 PROCEDURE — 1036F TOBACCO NON-USER: CPT | Performed by: COLON & RECTAL SURGERY

## 2024-08-16 PROCEDURE — 1123F ACP DISCUSS/DSCN MKR DOCD: CPT | Performed by: COLON & RECTAL SURGERY

## 2024-08-16 PROCEDURE — G8427 DOCREV CUR MEDS BY ELIG CLIN: HCPCS | Performed by: COLON & RECTAL SURGERY

## 2024-08-16 PROCEDURE — G8417 CALC BMI ABV UP PARAM F/U: HCPCS | Performed by: COLON & RECTAL SURGERY

## 2024-08-16 PROCEDURE — 1090F PRES/ABSN URINE INCON ASSESS: CPT | Performed by: COLON & RECTAL SURGERY

## 2024-08-16 PROCEDURE — 99213 OFFICE O/P EST LOW 20 MIN: CPT | Performed by: COLON & RECTAL SURGERY

## 2024-08-16 ASSESSMENT — ENCOUNTER SYMPTOMS
ABDOMINAL DISTENTION: 0
SHORTNESS OF BREATH: 0
CHEST TIGHTNESS: 0
NAUSEA: 0
ANAL BLEEDING: 0
COUGH: 0
COLOR CHANGE: 0
ABDOMINAL PAIN: 0
BACK PAIN: 0
BLOOD IN STOOL: 0
CONSTIPATION: 0
STRIDOR: 0
RECTAL PAIN: 0
WHEEZING: 0
APNEA: 0
DIARRHEA: 0
VOMITING: 0

## 2024-08-16 NOTE — PROGRESS NOTES
by Deejay Blanco DO at [facility] [place]     Family History   Problem Relation Age of Onset    Cancer Mother     Heart Attack Father      Social History     Tobacco Use    Smoking status: Former     Current packs/day: 0.00     Types: Cigarettes     Quit date: 1969     Years since quittin.2    Smokeless tobacco: Never   Vaping Use    Vaping status: Never Used   Substance Use Topics    Alcohol use: Not Currently    Drug use: Never     Current Outpatient Medications   Medication Sig Dispense Refill    budesonide (PULMICORT) 0.25 MG/2ML nebulizer suspension       albuterol (PROVENTIL) 2.5 MG/0.5ML NEBU nebulizer solution Take 0.5 mLs by nebulization every 6 hours as needed for Wheezing      glimepiride (AMARYL) 2 MG tablet Take 1 tablet by mouth daily      vitamin C (ASCORBIC ACID) 500 MG tablet Take 1 tablet by mouth daily      lovastatin (MEVACOR) 40 MG tablet Take 1 tablet by mouth nightly      brimonidine (ALPHAGAN) 0.2 % ophthalmic solution Place 1 drop into both eyes 2 times daily      levothyroxine (SYNTHROID) 25 MCG tablet Take 2 tablets by mouth Daily      Probiotic Product (PROBIOTIC-10 PO) Take by mouth 2 times daily      torsemide 40 MG TABS Take 40 mg by mouth daily 90 tablet 2     No current facility-administered medications for this visit.      Allergies   Allergen Reactions    Latex     Pantoprazole Shortness Of Breath    Sulfamethoxazole-Trimethoprim Shortness Of Breath    Methotrexate Derivatives     Penicillins     Percocet [Oxycodone-Acetaminophen]     Symbicort [Budesonide-Formoterol Fumarate]        Review of Systems   Constitutional:  Negative for activity change, appetite change, chills, diaphoresis, fatigue, fever and unexpected weight change.   Respiratory:  Negative for apnea, cough, chest tightness, shortness of breath, wheezing and stridor.    Cardiovascular:  Negative for chest pain and leg swelling.   Gastrointestinal:  Negative for abdominal distention, abdominal pain, anal bleeding,

## 2025-08-18 ENCOUNTER — HOSPITAL ENCOUNTER (OUTPATIENT)
Age: 89
Discharge: HOME | End: 2025-08-18
Payer: MEDICARE

## 2025-08-18 DIAGNOSIS — Z85.038 HISTORY OF COLON CANCER: ICD-10-CM

## 2025-08-18 DIAGNOSIS — Z85.038 HISTORY OF COLON CANCER IN ADULTHOOD: Primary | ICD-10-CM

## 2025-08-18 DIAGNOSIS — Z85.038 HISTORY OF COLON CANCER IN ADULTHOOD: ICD-10-CM

## 2025-08-18 LAB — CEA SERPL-MCNC: 2.3 NG/ML (ref 0–3.8)

## 2025-08-18 PROCEDURE — 36415 COLL VENOUS BLD VENIPUNCTURE: CPT

## 2025-08-18 PROCEDURE — 82378 CARCINOEMBRYONIC ANTIGEN: CPT

## 2025-08-21 ENCOUNTER — APPOINTMENT (OUTPATIENT)
Dept: GENERAL RADIOLOGY | Age: 89
End: 2025-08-21
Payer: MEDICARE

## 2025-08-21 ENCOUNTER — HOSPITAL ENCOUNTER (INPATIENT)
Age: 89
LOS: 5 days | Discharge: NURSING, REHAB OR LONG-TERM CARE | End: 2025-08-26
Attending: EMERGENCY MEDICINE | Admitting: INTERNAL MEDICINE
Payer: MEDICARE

## 2025-08-21 ENCOUNTER — APPOINTMENT (OUTPATIENT)
Dept: NUCLEAR MEDICINE | Age: 89
End: 2025-08-21
Payer: MEDICARE

## 2025-08-21 ENCOUNTER — RESULTS FOLLOW-UP (OUTPATIENT)
Dept: SURGERY | Age: 89
End: 2025-08-21

## 2025-08-21 DIAGNOSIS — A41.3: ICD-10-CM

## 2025-08-21 DIAGNOSIS — A41.9 SEPSIS, DUE TO UNSPECIFIED ORGANISM, UNSPECIFIED WHETHER ACUTE ORGAN DYSFUNCTION PRESENT (HCC): ICD-10-CM

## 2025-08-21 DIAGNOSIS — Z85.038 HISTORY OF COLON CANCER: ICD-10-CM

## 2025-08-21 DIAGNOSIS — Z85.038 HISTORY OF COLON CANCER IN ADULTHOOD: Primary | ICD-10-CM

## 2025-08-21 DIAGNOSIS — R06.02 SHORTNESS OF BREATH: ICD-10-CM

## 2025-08-21 DIAGNOSIS — J18.9 PNEUMONIA OF RIGHT LUNG DUE TO INFECTIOUS ORGANISM, UNSPECIFIED PART OF LUNG: Primary | ICD-10-CM

## 2025-08-21 LAB
ALBUMIN SERPL-MCNC: 3.9 G/DL (ref 3.5–5.2)
ALP SERPL-CCNC: 101 U/L (ref 35–104)
ALT SERPL-CCNC: 10 U/L (ref 10–35)
ANION GAP SERPL CALCULATED.3IONS-SCNC: 14 MMOL/L (ref 9–16)
AST SERPL-CCNC: 13 U/L (ref 10–35)
BACTERIA URNS QL MICRO: ABNORMAL
BASOPHILS # BLD: 0.03 K/UL (ref 0–0.2)
BASOPHILS NFR BLD: 0 % (ref 0–2)
BILIRUB SERPL-MCNC: 0.8 MG/DL (ref 0–1.2)
BILIRUB UR QL STRIP: NEGATIVE
BNP SERPL-MCNC: 753 PG/ML (ref 0–300)
BUN SERPL-MCNC: 27 MG/DL (ref 8–23)
CALCIUM SERPL-MCNC: 9.1 MG/DL (ref 8.6–10.4)
CASTS #/AREA URNS LPF: ABNORMAL /LPF
CHLORIDE SERPL-SCNC: 96 MMOL/L (ref 98–107)
CLARITY UR: CLEAR
CO2 SERPL-SCNC: 26 MMOL/L (ref 20–31)
COLOR UR: YELLOW
CREAT SERPL-MCNC: 1.5 MG/DL (ref 0.7–1.2)
D DIMER PPP FEU-MCNC: 1.22 UG/ML FEU (ref 0–0.59)
EOSINOPHIL # BLD: 0.05 K/UL (ref 0–0.44)
EOSINOPHILS RELATIVE PERCENT: 0 % (ref 0–4)
EPI CELLS #/AREA URNS HPF: ABNORMAL /HPF
ERYTHROCYTE [DISTWIDTH] IN BLOOD BY AUTOMATED COUNT: 13.2 % (ref 11.5–14.9)
GFR, ESTIMATED: 34 ML/MIN/1.73M2
GLUCOSE BLD-MCNC: 218 MG/DL (ref 65–105)
GLUCOSE SERPL-MCNC: 158 MG/DL (ref 74–99)
GLUCOSE UR STRIP-MCNC: NEGATIVE MG/DL
HCT VFR BLD AUTO: 40.8 % (ref 36–46)
HGB BLD-MCNC: 13.9 G/DL (ref 12–16)
HGB UR QL STRIP.AUTO: ABNORMAL
IMM GRANULOCYTES # BLD AUTO: 0.23 K/UL (ref 0–0.3)
IMM GRANULOCYTES NFR BLD: 1 %
INR PPP: 1.1
KETONES UR STRIP-MCNC: ABNORMAL MG/DL
LACTATE BLDV-SCNC: 1.2 MMOL/L (ref 0.5–1.9)
LACTATE BLDV-SCNC: 1.7 MMOL/L (ref 0.5–1.9)
LEUKOCYTE ESTERASE UR QL STRIP: ABNORMAL
LIPASE SERPL-CCNC: 18 U/L (ref 13–60)
LYMPHOCYTES NFR BLD: 0.76 K/UL (ref 1.1–3.7)
LYMPHOCYTES RELATIVE PERCENT: 4 % (ref 24–44)
MAGNESIUM SERPL-MCNC: 2.2 MG/DL (ref 1.6–2.4)
MCH RBC QN AUTO: 31.4 PG (ref 26–34)
MCHC RBC AUTO-ENTMCNC: 34.1 G/DL (ref 31–37)
MCV RBC AUTO: 92.1 FL (ref 80–100)
MONOCYTES NFR BLD: 0.83 K/UL (ref 0.1–1.2)
MONOCYTES NFR BLD: 4 % (ref 3–12)
NEUTROPHILS NFR BLD: 91 % (ref 36–66)
NEUTS SEG NFR BLD: 19.9 K/UL (ref 1.5–8.1)
NITRITE UR QL STRIP: NEGATIVE
NRBC BLD-RTO: 0 PER 100 WBC
PH UR STRIP: 5.5 [PH] (ref 5–8)
PLATELET # BLD AUTO: 214 K/UL (ref 150–450)
PMV BLD AUTO: 10.3 FL (ref 8–13.5)
POTASSIUM SERPL-SCNC: 3.7 MMOL/L (ref 3.7–5.3)
PROCALCITONIN SERPL-MCNC: 1.38 NG/ML (ref 0–0.09)
PROT SERPL-MCNC: 7.3 G/DL (ref 6.6–8.7)
PROT UR STRIP-MCNC: ABNORMAL MG/DL
PROTHROMBIN TIME: 15.1 SEC (ref 11.8–14.6)
RBC # BLD AUTO: 4.43 M/UL (ref 3.95–5.11)
RBC #/AREA URNS HPF: ABNORMAL /HPF
S PNEUM AG SPEC QL: NEGATIVE
SODIUM SERPL-SCNC: 136 MMOL/L (ref 136–145)
SP GR UR STRIP: 1.02 (ref 1–1.03)
SPECIMEN SOURCE: NORMAL
TROPONIN I SERPL HS-MCNC: 21 NG/L (ref 0–14)
TROPONIN I SERPL HS-MCNC: 24 NG/L (ref 0–14)
UROBILINOGEN UR STRIP-ACNC: NORMAL EU/DL (ref 0–1)
WBC #/AREA URNS HPF: ABNORMAL /HPF
WBC OTHER # BLD: 21.8 K/UL (ref 3.5–11)

## 2025-08-21 PROCEDURE — 83605 ASSAY OF LACTIC ACID: CPT

## 2025-08-21 PROCEDURE — 0202U NFCT DS 22 TRGT SARS-COV-2: CPT

## 2025-08-21 PROCEDURE — 99285 EMERGENCY DEPT VISIT HI MDM: CPT

## 2025-08-21 PROCEDURE — 6360000002 HC RX W HCPCS: Performed by: EMERGENCY MEDICINE

## 2025-08-21 PROCEDURE — 87077 CULTURE AEROBIC IDENTIFY: CPT

## 2025-08-21 PROCEDURE — 2500000003 HC RX 250 WO HCPCS: Performed by: EMERGENCY MEDICINE

## 2025-08-21 PROCEDURE — 2580000003 HC RX 258

## 2025-08-21 PROCEDURE — 80053 COMPREHEN METABOLIC PANEL: CPT

## 2025-08-21 PROCEDURE — A9539 TC99M PENTETATE: HCPCS | Performed by: EMERGENCY MEDICINE

## 2025-08-21 PROCEDURE — 83735 ASSAY OF MAGNESIUM: CPT

## 2025-08-21 PROCEDURE — 87185 SC STD ENZYME DETCJ PER NZM: CPT

## 2025-08-21 PROCEDURE — 87154 CUL TYP ID BLD PTHGN 6+ TRGT: CPT

## 2025-08-21 PROCEDURE — 84484 ASSAY OF TROPONIN QUANT: CPT

## 2025-08-21 PROCEDURE — 78582 LUNG VENTILAT&PERFUS IMAGING: CPT

## 2025-08-21 PROCEDURE — 6360000002 HC RX W HCPCS

## 2025-08-21 PROCEDURE — 87641 MR-STAPH DNA AMP PROBE: CPT

## 2025-08-21 PROCEDURE — A9540 TC99M MAA: HCPCS | Performed by: EMERGENCY MEDICINE

## 2025-08-21 PROCEDURE — 87899 AGENT NOS ASSAY W/OPTIC: CPT

## 2025-08-21 PROCEDURE — 2580000003 HC RX 258: Performed by: EMERGENCY MEDICINE

## 2025-08-21 PROCEDURE — 83690 ASSAY OF LIPASE: CPT

## 2025-08-21 PROCEDURE — 85610 PROTHROMBIN TIME: CPT

## 2025-08-21 PROCEDURE — 87449 NOS EACH ORGANISM AG IA: CPT

## 2025-08-21 PROCEDURE — 2500000003 HC RX 250 WO HCPCS

## 2025-08-21 PROCEDURE — 85025 COMPLETE CBC W/AUTO DIFF WBC: CPT

## 2025-08-21 PROCEDURE — 36415 COLL VENOUS BLD VENIPUNCTURE: CPT

## 2025-08-21 PROCEDURE — 71045 X-RAY EXAM CHEST 1 VIEW: CPT

## 2025-08-21 PROCEDURE — 6370000000 HC RX 637 (ALT 250 FOR IP)

## 2025-08-21 PROCEDURE — 93005 ELECTROCARDIOGRAM TRACING: CPT | Performed by: EMERGENCY MEDICINE

## 2025-08-21 PROCEDURE — 96365 THER/PROPH/DIAG IV INF INIT: CPT

## 2025-08-21 PROCEDURE — 81001 URINALYSIS AUTO W/SCOPE: CPT

## 2025-08-21 PROCEDURE — 99223 1ST HOSP IP/OBS HIGH 75: CPT | Performed by: INTERNAL MEDICINE

## 2025-08-21 PROCEDURE — 85379 FIBRIN DEGRADATION QUANT: CPT

## 2025-08-21 PROCEDURE — 3430000000 HC RX DIAGNOSTIC RADIOPHARMACEUTICAL: Performed by: EMERGENCY MEDICINE

## 2025-08-21 PROCEDURE — 87205 SMEAR GRAM STAIN: CPT

## 2025-08-21 PROCEDURE — 82947 ASSAY GLUCOSE BLOOD QUANT: CPT

## 2025-08-21 PROCEDURE — 84145 PROCALCITONIN (PCT): CPT

## 2025-08-21 PROCEDURE — 2060000000 HC ICU INTERMEDIATE R&B

## 2025-08-21 PROCEDURE — 87040 BLOOD CULTURE FOR BACTERIA: CPT

## 2025-08-21 PROCEDURE — 83880 ASSAY OF NATRIURETIC PEPTIDE: CPT

## 2025-08-21 RX ORDER — ATORVASTATIN CALCIUM 10 MG/1
10 TABLET, FILM COATED ORAL DAILY
Status: DISCONTINUED | OUTPATIENT
Start: 2025-08-21 | End: 2025-08-26 | Stop reason: HOSPADM

## 2025-08-21 RX ORDER — ACETAMINOPHEN 650 MG/1
650 SUPPOSITORY RECTAL EVERY 6 HOURS PRN
Status: DISCONTINUED | OUTPATIENT
Start: 2025-08-21 | End: 2025-08-26 | Stop reason: HOSPADM

## 2025-08-21 RX ORDER — INSULIN LISPRO 100 [IU]/ML
0-4 INJECTION, SOLUTION INTRAVENOUS; SUBCUTANEOUS
Status: DISCONTINUED | OUTPATIENT
Start: 2025-08-21 | End: 2025-08-23

## 2025-08-21 RX ORDER — ATORVASTATIN CALCIUM 20 MG/1
10 TABLET, FILM COATED ORAL DAILY
Status: CANCELLED | OUTPATIENT
Start: 2025-08-21

## 2025-08-21 RX ORDER — INSULIN GLARGINE 100 [IU]/ML
5 INJECTION, SOLUTION SUBCUTANEOUS NIGHTLY
Status: DISCONTINUED | OUTPATIENT
Start: 2025-08-21 | End: 2025-08-23

## 2025-08-21 RX ORDER — TORSEMIDE 20 MG/1
40 TABLET ORAL DAILY
COMMUNITY

## 2025-08-21 RX ORDER — HEPARIN SODIUM 5000 [USP'U]/ML
5000 INJECTION, SOLUTION INTRAVENOUS; SUBCUTANEOUS EVERY 8 HOURS SCHEDULED
Status: DISCONTINUED | OUTPATIENT
Start: 2025-08-21 | End: 2025-08-26 | Stop reason: HOSPADM

## 2025-08-21 RX ORDER — ONDANSETRON 2 MG/ML
4 INJECTION INTRAMUSCULAR; INTRAVENOUS EVERY 6 HOURS PRN
Status: DISCONTINUED | OUTPATIENT
Start: 2025-08-21 | End: 2025-08-26 | Stop reason: HOSPADM

## 2025-08-21 RX ORDER — SODIUM CHLORIDE 9 MG/ML
INJECTION, SOLUTION INTRAVENOUS PRN
Status: DISCONTINUED | OUTPATIENT
Start: 2025-08-21 | End: 2025-08-26 | Stop reason: HOSPADM

## 2025-08-21 RX ORDER — TORSEMIDE 20 MG/1
40 TABLET ORAL DAILY
Status: DISCONTINUED | OUTPATIENT
Start: 2025-08-22 | End: 2025-08-26 | Stop reason: HOSPADM

## 2025-08-21 RX ORDER — LEVALBUTEROL INHALATION SOLUTION 1.25 MG/3ML
1.25 SOLUTION RESPIRATORY (INHALATION) EVERY 6 HOURS PRN
Status: DISCONTINUED | OUTPATIENT
Start: 2025-08-21 | End: 2025-08-26 | Stop reason: HOSPADM

## 2025-08-21 RX ORDER — SODIUM CHLORIDE 9 MG/ML
INJECTION, SOLUTION INTRAVENOUS CONTINUOUS
Status: DISCONTINUED | OUTPATIENT
Start: 2025-08-21 | End: 2025-08-22

## 2025-08-21 RX ORDER — DEXTROSE MONOHYDRATE 100 MG/ML
INJECTION, SOLUTION INTRAVENOUS CONTINUOUS PRN
Status: DISCONTINUED | OUTPATIENT
Start: 2025-08-21 | End: 2025-08-26 | Stop reason: HOSPADM

## 2025-08-21 RX ORDER — LEVOTHYROXINE SODIUM 50 UG/1
50 TABLET ORAL DAILY
Status: DISCONTINUED | OUTPATIENT
Start: 2025-08-22 | End: 2025-08-26 | Stop reason: HOSPADM

## 2025-08-21 RX ORDER — SODIUM CHLORIDE 0.9 % (FLUSH) 0.9 %
5-40 SYRINGE (ML) INJECTION EVERY 12 HOURS SCHEDULED
Status: DISCONTINUED | OUTPATIENT
Start: 2025-08-21 | End: 2025-08-26 | Stop reason: HOSPADM

## 2025-08-21 RX ORDER — KIT FOR THE PREPARATION OF TECHNETIUM TC 99M PENTETATE 20 MG/1
40 INJECTION, POWDER, LYOPHILIZED, FOR SOLUTION INTRAVENOUS; RESPIRATORY (INHALATION)
Status: COMPLETED | OUTPATIENT
Start: 2025-08-21 | End: 2025-08-21

## 2025-08-21 RX ORDER — SODIUM CHLORIDE 0.9 % (FLUSH) 0.9 %
10 SYRINGE (ML) INJECTION PRN
Status: DISCONTINUED | OUTPATIENT
Start: 2025-08-21 | End: 2025-08-26 | Stop reason: HOSPADM

## 2025-08-21 RX ORDER — SODIUM CHLORIDE 0.9 % (FLUSH) 0.9 %
5-40 SYRINGE (ML) INJECTION PRN
Status: DISCONTINUED | OUTPATIENT
Start: 2025-08-21 | End: 2025-08-26 | Stop reason: HOSPADM

## 2025-08-21 RX ORDER — POLYETHYLENE GLYCOL 3350 17 G/17G
17 POWDER, FOR SOLUTION ORAL DAILY PRN
Status: DISCONTINUED | OUTPATIENT
Start: 2025-08-21 | End: 2025-08-26 | Stop reason: HOSPADM

## 2025-08-21 RX ORDER — ONDANSETRON 4 MG/1
4 TABLET, ORALLY DISINTEGRATING ORAL EVERY 8 HOURS PRN
Status: DISCONTINUED | OUTPATIENT
Start: 2025-08-21 | End: 2025-08-26 | Stop reason: HOSPADM

## 2025-08-21 RX ORDER — ENOXAPARIN SODIUM 100 MG/ML
30 INJECTION SUBCUTANEOUS DAILY
Status: CANCELLED | OUTPATIENT
Start: 2025-08-21

## 2025-08-21 RX ORDER — LEVOTHYROXINE SODIUM 50 UG/1
50 TABLET ORAL DAILY
Status: CANCELLED | OUTPATIENT
Start: 2025-08-21

## 2025-08-21 RX ORDER — 0.9 % SODIUM CHLORIDE 0.9 %
30 INTRAVENOUS SOLUTION INTRAVENOUS ONCE
Status: COMPLETED | OUTPATIENT
Start: 2025-08-21 | End: 2025-08-21

## 2025-08-21 RX ORDER — ACETAMINOPHEN 325 MG/1
650 TABLET ORAL EVERY 6 HOURS PRN
Status: DISCONTINUED | OUTPATIENT
Start: 2025-08-21 | End: 2025-08-26 | Stop reason: HOSPADM

## 2025-08-21 RX ADMIN — Medication 9.3 MILLICURIE: at 14:48

## 2025-08-21 RX ADMIN — CEFTRIAXONE SODIUM 2000 MG: 2 INJECTION, POWDER, FOR SOLUTION INTRAMUSCULAR; INTRAVENOUS at 16:41

## 2025-08-21 RX ADMIN — KIT FOR THE PREPARATION OF TECHNETIUM TC 99M PENTETATE 47.6 MILLICURIE: 20 INJECTION, POWDER, LYOPHILIZED, FOR SOLUTION INTRAVENOUS; RESPIRATORY (INHALATION) at 14:29

## 2025-08-21 RX ADMIN — SODIUM CHLORIDE, PRESERVATIVE FREE 10 ML: 5 INJECTION INTRAVENOUS at 14:48

## 2025-08-21 RX ADMIN — CEFEPIME 2000 MG: 2 INJECTION, POWDER, FOR SOLUTION INTRAVENOUS at 12:53

## 2025-08-21 RX ADMIN — INSULIN GLARGINE 5 UNITS: 100 INJECTION, SOLUTION SUBCUTANEOUS at 21:44

## 2025-08-21 RX ADMIN — INSULIN LISPRO 1 UNITS: 100 INJECTION, SOLUTION INTRAVENOUS; SUBCUTANEOUS at 21:44

## 2025-08-21 RX ADMIN — HEPARIN SODIUM 5000 UNITS: 5000 INJECTION, SOLUTION INTRAVENOUS; SUBCUTANEOUS at 16:41

## 2025-08-21 RX ADMIN — SODIUM CHLORIDE, PRESERVATIVE FREE 10 ML: 5 INJECTION INTRAVENOUS at 14:49

## 2025-08-21 RX ADMIN — SODIUM CHLORIDE: 0.9 INJECTION, SOLUTION INTRAVENOUS at 16:40

## 2025-08-21 RX ADMIN — METHYLPREDNISOLONE SODIUM SUCCINATE 40 MG: 40 INJECTION, POWDER, LYOPHILIZED, FOR SOLUTION INTRAMUSCULAR; INTRAVENOUS at 16:48

## 2025-08-21 RX ADMIN — VANCOMYCIN HYDROCHLORIDE 2000 MG: 1 INJECTION, POWDER, LYOPHILIZED, FOR SOLUTION INTRAVENOUS at 15:08

## 2025-08-21 RX ADMIN — ATORVASTATIN CALCIUM 10 MG: 10 TABLET, FILM COATED ORAL at 16:41

## 2025-08-21 RX ADMIN — AZITHROMYCIN MONOHYDRATE 500 MG: 500 INJECTION, POWDER, LYOPHILIZED, FOR SOLUTION INTRAVENOUS at 17:10

## 2025-08-21 RX ADMIN — SODIUM CHLORIDE 1503 ML: 0.9 INJECTION, SOLUTION INTRAVENOUS at 12:30

## 2025-08-21 ASSESSMENT — LIFESTYLE VARIABLES
HOW MANY STANDARD DRINKS CONTAINING ALCOHOL DO YOU HAVE ON A TYPICAL DAY: PATIENT DOES NOT DRINK
HOW OFTEN DO YOU HAVE A DRINK CONTAINING ALCOHOL: NEVER

## 2025-08-21 ASSESSMENT — PAIN - FUNCTIONAL ASSESSMENT: PAIN_FUNCTIONAL_ASSESSMENT: 0-10

## 2025-08-21 ASSESSMENT — PAIN SCALES - GENERAL: PAINLEVEL_OUTOF10: 0

## 2025-08-22 ENCOUNTER — APPOINTMENT (OUTPATIENT)
Age: 89
End: 2025-08-22
Payer: MEDICARE

## 2025-08-22 ENCOUNTER — APPOINTMENT (OUTPATIENT)
Dept: CT IMAGING | Age: 89
End: 2025-08-22
Payer: MEDICARE

## 2025-08-22 LAB
ANION GAP SERPL CALCULATED.3IONS-SCNC: 14 MMOL/L (ref 9–16)
ATYPICAL LYMPHOCYTE ABSOLUTE COUNT: 0.13 K/UL
ATYPICAL LYMPHOCYTES: 1 %
B PARAP IS1001 DNA NPH QL NAA+NON-PROBE: NOT DETECTED
B PERT DNA SPEC QL NAA+PROBE: NOT DETECTED
BASOPHILS # BLD: 0 K/UL (ref 0–0.2)
BASOPHILS NFR BLD: 0 % (ref 0–2)
BUN SERPL-MCNC: 23 MG/DL (ref 8–23)
C PNEUM DNA NPH QL NAA+NON-PROBE: NOT DETECTED
CALCIUM SERPL-MCNC: 8.4 MG/DL (ref 8.6–10.4)
CHLORIDE SERPL-SCNC: 101 MMOL/L (ref 98–107)
CO2 SERPL-SCNC: 22 MMOL/L (ref 20–31)
CREAT SERPL-MCNC: 1.2 MG/DL (ref 0.7–1.2)
ECHO AO ROOT DIAM: 3 CM
ECHO AO ROOT INDEX: 1.65 CM/M2
ECHO AV AREA PEAK VELOCITY: 2.1 CM2
ECHO AV AREA VTI: 2.2 CM2
ECHO AV AREA/BSA PEAK VELOCITY: 1.2 CM2/M2
ECHO AV AREA/BSA VTI: 1.2 CM2/M2
ECHO AV MEAN GRADIENT: 11 MMHG
ECHO AV MEAN VELOCITY: 1.5 M/S
ECHO AV PEAK GRADIENT: 18 MMHG
ECHO AV PEAK VELOCITY: 2.1 M/S
ECHO AV VELOCITY RATIO: 0.62
ECHO AV VTI: 32.4 CM
ECHO BSA: 1.88 M2
ECHO EST RA PRESSURE: 3 MMHG
ECHO LA AREA 2C: 13.4 CM2
ECHO LA AREA 4C: 14.6 CM2
ECHO LA DIAMETER INDEX: 1.7 CM/M2
ECHO LA DIAMETER: 3.1 CM
ECHO LA MAJOR AXIS: 5.5 CM
ECHO LA MINOR AXIS: 5.2 CM
ECHO LA TO AORTIC ROOT RATIO: 1.03
ECHO LA VOL BP: 32 ML (ref 22–52)
ECHO LA VOL MOD A2C: 29 ML (ref 22–52)
ECHO LA VOL MOD A4C: 32 ML (ref 22–52)
ECHO LA VOL/BSA BIPLANE: 18 ML/M2 (ref 16–34)
ECHO LA VOLUME INDEX MOD A2C: 16 ML/M2 (ref 16–34)
ECHO LA VOLUME INDEX MOD A4C: 18 ML/M2 (ref 16–34)
ECHO LV E' LATERAL VELOCITY: 6.42 CM/S
ECHO LV E' SEPTAL VELOCITY: 5.33 CM/S
ECHO LV EF PHYSICIAN: 55 %
ECHO LV FRACTIONAL SHORTENING: 20 % (ref 28–44)
ECHO LV INTERNAL DIMENSION DIASTOLE INDEX: 2.47 CM/M2
ECHO LV INTERNAL DIMENSION DIASTOLIC: 4.5 CM (ref 3.9–5.3)
ECHO LV INTERNAL DIMENSION SYSTOLIC INDEX: 1.98 CM/M2
ECHO LV INTERNAL DIMENSION SYSTOLIC: 3.6 CM
ECHO LV IVSD: 0.9 CM (ref 0.6–0.9)
ECHO LV MASS 2D: 132.8 G (ref 67–162)
ECHO LV MASS INDEX 2D: 73 G/M2 (ref 43–95)
ECHO LV POSTERIOR WALL DIASTOLIC: 0.9 CM (ref 0.6–0.9)
ECHO LV RELATIVE WALL THICKNESS RATIO: 0.4
ECHO LVOT AREA: 3.5 CM2
ECHO LVOT AV VTI INDEX: 0.64
ECHO LVOT DIAM: 2.1 CM
ECHO LVOT MEAN GRADIENT: 3 MMHG
ECHO LVOT PEAK GRADIENT: 6 MMHG
ECHO LVOT PEAK VELOCITY: 1.3 M/S
ECHO LVOT STROKE VOLUME INDEX: 39.2 ML/M2
ECHO LVOT SV: 71.3 ML
ECHO LVOT VTI: 20.6 CM
ECHO MV A VELOCITY: 1.17 M/S
ECHO MV E DECELERATION TIME (DT): 124 MS
ECHO MV E VELOCITY: 0.94 M/S
ECHO MV E/A RATIO: 0.8
ECHO MV E/E' LATERAL: 14.64
ECHO MV E/E' RATIO (AVERAGED): 16.14
ECHO MV E/E' SEPTAL: 17.64
ECHO RA AREA 4C: 8.9 CM2
ECHO RA END SYSTOLIC VOLUME APICAL 4 CHAMBER INDEX BSA: 9 ML/M2
ECHO RA VOLUME: 17 ML
ECHO RIGHT VENTRICULAR SYSTOLIC PRESSURE (RVSP): 54 MMHG
ECHO RV INTERNAL DIMENSION: 3 CM
ECHO RV TAPSE: 1.7 CM (ref 1.7–?)
ECHO TV REGURGITANT MAX VELOCITY: 3.58 M/S
ECHO TV REGURGITANT PEAK GRADIENT: 51 MMHG
EKG ATRIAL RATE: 91 BPM
EKG P AXIS: 70 DEGREES
EKG P-R INTERVAL: 148 MS
EKG Q-T INTERVAL: 380 MS
EKG QRS DURATION: 142 MS
EKG QTC CALCULATION (BAZETT): 467 MS
EKG R AXIS: -52 DEGREES
EKG T AXIS: 27 DEGREES
EKG VENTRICULAR RATE: 91 BPM
EOSINOPHIL # BLD: 0 K/UL (ref 0–0.4)
EOSINOPHILS RELATIVE PERCENT: 0 % (ref 0–4)
ERYTHROCYTE [DISTWIDTH] IN BLOOD BY AUTOMATED COUNT: 13 % (ref 11.5–14.9)
FLUAV RNA NPH QL NAA+NON-PROBE: NOT DETECTED
FLUBV RNA NPH QL NAA+NON-PROBE: NOT DETECTED
GFR, ESTIMATED: 44 ML/MIN/1.73M2
GLUCOSE BLD-MCNC: 253 MG/DL (ref 65–105)
GLUCOSE BLD-MCNC: 286 MG/DL (ref 65–105)
GLUCOSE BLD-MCNC: 310 MG/DL (ref 65–105)
GLUCOSE BLD-MCNC: 331 MG/DL (ref 65–105)
GLUCOSE SERPL-MCNC: 264 MG/DL (ref 74–99)
HADV DNA NPH QL NAA+NON-PROBE: NOT DETECTED
HCOV 229E RNA NPH QL NAA+NON-PROBE: NOT DETECTED
HCOV HKU1 RNA NPH QL NAA+NON-PROBE: NOT DETECTED
HCOV NL63 RNA NPH QL NAA+NON-PROBE: NOT DETECTED
HCOV OC43 RNA NPH QL NAA+NON-PROBE: NOT DETECTED
HCT VFR BLD AUTO: 38 % (ref 36–46)
HGB BLD-MCNC: 12.4 G/DL (ref 12–16)
HMPV RNA NPH QL NAA+NON-PROBE: NOT DETECTED
HPIV1 RNA NPH QL NAA+NON-PROBE: NOT DETECTED
HPIV2 RNA NPH QL NAA+NON-PROBE: NOT DETECTED
HPIV3 RNA NPH QL NAA+NON-PROBE: NOT DETECTED
HPIV4 RNA NPH QL NAA+NON-PROBE: NOT DETECTED
IMM GRANULOCYTES # BLD AUTO: 0 K/UL (ref 0–0.3)
IMM GRANULOCYTES NFR BLD: 0 %
L PNEUMO1 AG UR QL IA.RAPID: NEGATIVE
LYMPHOCYTES NFR BLD: 0.4 K/UL (ref 1–4.8)
LYMPHOCYTES RELATIVE PERCENT: 3 % (ref 24–44)
M PNEUMO DNA NPH QL NAA+NON-PROBE: NOT DETECTED
MCH RBC QN AUTO: 31 PG (ref 26–34)
MCHC RBC AUTO-ENTMCNC: 32.6 G/DL (ref 31–37)
MCV RBC AUTO: 95 FL (ref 80–100)
MONOCYTES NFR BLD: 0.13 K/UL (ref 0.1–1.3)
MONOCYTES NFR BLD: 1 % (ref 1–7)
MORPHOLOGY: ABNORMAL
MORPHOLOGY: ABNORMAL
MRSA, DNA, NASAL: NEGATIVE
NEUTROPHILS NFR BLD: 95 % (ref 36–66)
NEUTS SEG NFR BLD: 12.64 K/UL (ref 1.3–9.1)
NRBC BLD-RTO: 0 PER 100 WBC
PLATELET # BLD AUTO: 199 K/UL (ref 150–450)
PMV BLD AUTO: 10 FL (ref 8–13.5)
POTASSIUM SERPL-SCNC: 4 MMOL/L (ref 3.7–5.3)
RBC # BLD AUTO: 4 M/UL (ref 3.95–5.11)
RSV RNA NPH QL NAA+NON-PROBE: NOT DETECTED
RV+EV RNA NPH QL NAA+NON-PROBE: NOT DETECTED
SARS-COV-2 RNA NPH QL NAA+NON-PROBE: NOT DETECTED
SODIUM SERPL-SCNC: 137 MMOL/L (ref 136–145)
SPECIMEN DESCRIPTION: NORMAL
SPECIMEN DESCRIPTION: NORMAL
WBC OTHER # BLD: 13.3 K/UL (ref 3.5–11)

## 2025-08-22 PROCEDURE — 2500000003 HC RX 250 WO HCPCS

## 2025-08-22 PROCEDURE — 2580000003 HC RX 258

## 2025-08-22 PROCEDURE — 93010 ELECTROCARDIOGRAM REPORT: CPT | Performed by: INTERNAL MEDICINE

## 2025-08-22 PROCEDURE — 94760 N-INVAS EAR/PLS OXIMETRY 1: CPT

## 2025-08-22 PROCEDURE — 36415 COLL VENOUS BLD VENIPUNCTURE: CPT

## 2025-08-22 PROCEDURE — 2060000000 HC ICU INTERMEDIATE R&B

## 2025-08-22 PROCEDURE — 97162 PT EVAL MOD COMPLEX 30 MIN: CPT

## 2025-08-22 PROCEDURE — 97166 OT EVAL MOD COMPLEX 45 MIN: CPT

## 2025-08-22 PROCEDURE — 97535 SELF CARE MNGMENT TRAINING: CPT

## 2025-08-22 PROCEDURE — 6370000000 HC RX 637 (ALT 250 FOR IP)

## 2025-08-22 PROCEDURE — 94640 AIRWAY INHALATION TREATMENT: CPT

## 2025-08-22 PROCEDURE — 93306 TTE W/DOPPLER COMPLETE: CPT

## 2025-08-22 PROCEDURE — 2700000000 HC OXYGEN THERAPY PER DAY

## 2025-08-22 PROCEDURE — 6360000002 HC RX W HCPCS

## 2025-08-22 PROCEDURE — 85025 COMPLETE CBC W/AUTO DIFF WBC: CPT

## 2025-08-22 PROCEDURE — 87086 URINE CULTURE/COLONY COUNT: CPT

## 2025-08-22 PROCEDURE — 86738 MYCOPLASMA ANTIBODY: CPT

## 2025-08-22 PROCEDURE — 99233 SBSQ HOSP IP/OBS HIGH 50: CPT | Performed by: INTERNAL MEDICINE

## 2025-08-22 PROCEDURE — 80048 BASIC METABOLIC PNL TOTAL CA: CPT

## 2025-08-22 PROCEDURE — 71250 CT THORAX DX C-: CPT

## 2025-08-22 PROCEDURE — 82947 ASSAY GLUCOSE BLOOD QUANT: CPT

## 2025-08-22 PROCEDURE — 97530 THERAPEUTIC ACTIVITIES: CPT

## 2025-08-22 RX ORDER — METOPROLOL TARTRATE 1 MG/ML
5 INJECTION, SOLUTION INTRAVENOUS ONCE
Status: DISCONTINUED | OUTPATIENT
Start: 2025-08-22 | End: 2025-08-26 | Stop reason: HOSPADM

## 2025-08-22 RX ORDER — FUROSEMIDE 10 MG/ML
20 INJECTION INTRAMUSCULAR; INTRAVENOUS ONCE
Status: COMPLETED | OUTPATIENT
Start: 2025-08-22 | End: 2025-08-22

## 2025-08-22 RX ADMIN — INSULIN LISPRO 2 UNITS: 100 INJECTION, SOLUTION INTRAVENOUS; SUBCUTANEOUS at 10:23

## 2025-08-22 RX ADMIN — CEFTRIAXONE SODIUM 2000 MG: 2 INJECTION, POWDER, FOR SOLUTION INTRAMUSCULAR; INTRAVENOUS at 18:48

## 2025-08-22 RX ADMIN — METHYLPREDNISOLONE SODIUM SUCCINATE 40 MG: 40 INJECTION, POWDER, LYOPHILIZED, FOR SOLUTION INTRAMUSCULAR; INTRAVENOUS at 23:13

## 2025-08-22 RX ADMIN — ATORVASTATIN CALCIUM 10 MG: 10 TABLET, FILM COATED ORAL at 09:57

## 2025-08-22 RX ADMIN — HEPARIN SODIUM 5000 UNITS: 5000 INJECTION, SOLUTION INTRAVENOUS; SUBCUTANEOUS at 23:13

## 2025-08-22 RX ADMIN — METHYLPREDNISOLONE SODIUM SUCCINATE 40 MG: 40 INJECTION, POWDER, LYOPHILIZED, FOR SOLUTION INTRAMUSCULAR; INTRAVENOUS at 00:49

## 2025-08-22 RX ADMIN — INSULIN LISPRO 2 UNITS: 100 INJECTION, SOLUTION INTRAVENOUS; SUBCUTANEOUS at 12:25

## 2025-08-22 RX ADMIN — SODIUM CHLORIDE, PRESERVATIVE FREE 10 ML: 5 INJECTION INTRAVENOUS at 20:18

## 2025-08-22 RX ADMIN — METHYLPREDNISOLONE SODIUM SUCCINATE 40 MG: 40 INJECTION, POWDER, LYOPHILIZED, FOR SOLUTION INTRAMUSCULAR; INTRAVENOUS at 09:57

## 2025-08-22 RX ADMIN — INSULIN LISPRO 3 UNITS: 100 INJECTION, SOLUTION INTRAVENOUS; SUBCUTANEOUS at 20:18

## 2025-08-22 RX ADMIN — LEVALBUTEROL HYDROCHLORIDE 1.25 MG: 1.25 SOLUTION RESPIRATORY (INHALATION) at 03:33

## 2025-08-22 RX ADMIN — FUROSEMIDE 20 MG: 10 INJECTION, SOLUTION INTRAMUSCULAR; INTRAVENOUS at 10:23

## 2025-08-22 RX ADMIN — METHYLPREDNISOLONE SODIUM SUCCINATE 40 MG: 40 INJECTION, POWDER, LYOPHILIZED, FOR SOLUTION INTRAMUSCULAR; INTRAVENOUS at 16:16

## 2025-08-22 RX ADMIN — LEVOTHYROXINE SODIUM 50 MCG: 0.05 TABLET ORAL at 06:18

## 2025-08-22 RX ADMIN — HEPARIN SODIUM 5000 UNITS: 5000 INJECTION, SOLUTION INTRAVENOUS; SUBCUTANEOUS at 00:49

## 2025-08-22 RX ADMIN — INSULIN GLARGINE 5 UNITS: 100 INJECTION, SOLUTION SUBCUTANEOUS at 20:18

## 2025-08-22 RX ADMIN — INSULIN LISPRO 4 UNITS: 100 INJECTION, SOLUTION INTRAVENOUS; SUBCUTANEOUS at 18:11

## 2025-08-22 RX ADMIN — AZITHROMYCIN MONOHYDRATE 500 MG: 500 INJECTION, POWDER, LYOPHILIZED, FOR SOLUTION INTRAVENOUS at 16:17

## 2025-08-22 RX ADMIN — HEPARIN SODIUM 5000 UNITS: 5000 INJECTION, SOLUTION INTRAVENOUS; SUBCUTANEOUS at 15:48

## 2025-08-22 ASSESSMENT — PAIN SCALES - GENERAL: PAINLEVEL_OUTOF10: 0

## 2025-08-23 PROBLEM — A49.2 HAEMOPHILUS INFLUENZAE INFECTION: Status: ACTIVE | Noted: 2025-08-23

## 2025-08-23 PROBLEM — D72.829 LEUKOCYTOSIS: Status: ACTIVE | Noted: 2025-08-23

## 2025-08-23 PROBLEM — Z88.1 ALLERGY TO MULTIPLE ANTIBIOTICS: Status: ACTIVE | Noted: 2025-08-23

## 2025-08-23 PROBLEM — R79.89 ELEVATED PROCALCITONIN: Status: ACTIVE | Noted: 2025-08-23

## 2025-08-23 PROBLEM — R78.81 BACTEREMIA: Status: ACTIVE | Noted: 2025-08-23

## 2025-08-23 LAB
ANION GAP SERPL CALCULATED.3IONS-SCNC: 16 MMOL/L (ref 9–16)
BASOPHILS # BLD: 0 K/UL (ref 0–0.2)
BASOPHILS NFR BLD: 0 % (ref 0–2)
BUN SERPL-MCNC: 27 MG/DL (ref 8–23)
CALCIUM SERPL-MCNC: 9.1 MG/DL (ref 8.6–10.4)
CHLORIDE SERPL-SCNC: 102 MMOL/L (ref 98–107)
CO2 SERPL-SCNC: 22 MMOL/L (ref 20–31)
CREAT SERPL-MCNC: 1.1 MG/DL (ref 0.7–1.2)
EOSINOPHIL # BLD: 0 K/UL (ref 0–0.4)
EOSINOPHILS RELATIVE PERCENT: 0 % (ref 0–4)
ERYTHROCYTE [DISTWIDTH] IN BLOOD BY AUTOMATED COUNT: 13 % (ref 11.5–14.9)
GFR, ESTIMATED: 49 ML/MIN/1.73M2
GLUCOSE BLD-MCNC: 238 MG/DL (ref 65–105)
GLUCOSE BLD-MCNC: 270 MG/DL (ref 65–105)
GLUCOSE BLD-MCNC: 310 MG/DL (ref 65–105)
GLUCOSE BLD-MCNC: 321 MG/DL (ref 65–105)
GLUCOSE SERPL-MCNC: 274 MG/DL (ref 74–99)
HCT VFR BLD AUTO: 40.9 % (ref 36–46)
HGB BLD-MCNC: 13.3 G/DL (ref 12–16)
IMM GRANULOCYTES # BLD AUTO: 0 K/UL (ref 0–0.3)
IMM GRANULOCYTES NFR BLD: 0 %
LYMPHOCYTES NFR BLD: 0.86 K/UL (ref 1–4.8)
LYMPHOCYTES RELATIVE PERCENT: 7 % (ref 24–44)
MCH RBC QN AUTO: 30.6 PG (ref 26–34)
MCHC RBC AUTO-ENTMCNC: 32.5 G/DL (ref 31–37)
MCV RBC AUTO: 94.2 FL (ref 80–100)
MICROORGANISM SPEC CULT: ABNORMAL
MICROORGANISM SPEC CULT: NORMAL
MONOCYTES NFR BLD: 0.25 K/UL (ref 0.1–1.3)
MONOCYTES NFR BLD: 2 % (ref 1–7)
MORPHOLOGY: ABNORMAL
NEUTROPHILS NFR BLD: 91 % (ref 36–66)
NEUTS SEG NFR BLD: 11.19 K/UL (ref 1.3–9.1)
NRBC BLD-RTO: 0 PER 100 WBC
PLATELET # BLD AUTO: 278 K/UL (ref 150–450)
PMV BLD AUTO: 9.9 FL (ref 8–13.5)
POTASSIUM SERPL-SCNC: 3.8 MMOL/L (ref 3.7–5.3)
RBC # BLD AUTO: 4.34 M/UL (ref 3.95–5.11)
SERVICE CMNT-IMP: ABNORMAL
SERVICE CMNT-IMP: ABNORMAL
SERVICE CMNT-IMP: NORMAL
SODIUM SERPL-SCNC: 140 MMOL/L (ref 136–145)
SPECIMEN DESCRIPTION: ABNORMAL
SPECIMEN DESCRIPTION: ABNORMAL
SPECIMEN DESCRIPTION: NORMAL
WBC OTHER # BLD: 12.3 K/UL (ref 3.5–11)

## 2025-08-23 PROCEDURE — 36415 COLL VENOUS BLD VENIPUNCTURE: CPT

## 2025-08-23 PROCEDURE — 6370000000 HC RX 637 (ALT 250 FOR IP)

## 2025-08-23 PROCEDURE — 6360000002 HC RX W HCPCS: Performed by: NURSE PRACTITIONER

## 2025-08-23 PROCEDURE — 82947 ASSAY GLUCOSE BLOOD QUANT: CPT

## 2025-08-23 PROCEDURE — 80048 BASIC METABOLIC PNL TOTAL CA: CPT

## 2025-08-23 PROCEDURE — 94640 AIRWAY INHALATION TREATMENT: CPT

## 2025-08-23 PROCEDURE — 2700000000 HC OXYGEN THERAPY PER DAY

## 2025-08-23 PROCEDURE — 2580000003 HC RX 258: Performed by: NURSE PRACTITIONER

## 2025-08-23 PROCEDURE — 94760 N-INVAS EAR/PLS OXIMETRY 1: CPT

## 2025-08-23 PROCEDURE — 85025 COMPLETE CBC W/AUTO DIFF WBC: CPT

## 2025-08-23 PROCEDURE — 2500000003 HC RX 250 WO HCPCS

## 2025-08-23 PROCEDURE — 6360000002 HC RX W HCPCS

## 2025-08-23 PROCEDURE — 87040 BLOOD CULTURE FOR BACTERIA: CPT

## 2025-08-23 PROCEDURE — 2060000000 HC ICU INTERMEDIATE R&B

## 2025-08-23 PROCEDURE — 99233 SBSQ HOSP IP/OBS HIGH 50: CPT | Performed by: INTERNAL MEDICINE

## 2025-08-23 PROCEDURE — 99222 1ST HOSP IP/OBS MODERATE 55: CPT | Performed by: NURSE PRACTITIONER

## 2025-08-23 RX ORDER — INSULIN GLARGINE 100 [IU]/ML
10 INJECTION, SOLUTION SUBCUTANEOUS NIGHTLY
Status: DISCONTINUED | OUTPATIENT
Start: 2025-08-23 | End: 2025-08-26 | Stop reason: HOSPADM

## 2025-08-23 RX ORDER — PREDNISONE 20 MG/1
40 TABLET ORAL DAILY
Status: DISCONTINUED | OUTPATIENT
Start: 2025-08-23 | End: 2025-08-26 | Stop reason: HOSPADM

## 2025-08-23 RX ORDER — FUROSEMIDE 10 MG/ML
40 INJECTION INTRAMUSCULAR; INTRAVENOUS 2 TIMES DAILY
Status: DISCONTINUED | OUTPATIENT
Start: 2025-08-23 | End: 2025-08-25

## 2025-08-23 RX ORDER — FUROSEMIDE 10 MG/ML
40 INJECTION INTRAMUSCULAR; INTRAVENOUS 2 TIMES DAILY
Status: DISCONTINUED | OUTPATIENT
Start: 2025-08-23 | End: 2025-08-23

## 2025-08-23 RX ORDER — FUROSEMIDE 10 MG/ML
40 INJECTION INTRAMUSCULAR; INTRAVENOUS DAILY
Status: DISCONTINUED | OUTPATIENT
Start: 2025-08-24 | End: 2025-08-23

## 2025-08-23 RX ORDER — INSULIN LISPRO 100 [IU]/ML
0-8 INJECTION, SOLUTION INTRAVENOUS; SUBCUTANEOUS
Status: DISCONTINUED | OUTPATIENT
Start: 2025-08-23 | End: 2025-08-26 | Stop reason: HOSPADM

## 2025-08-23 RX ADMIN — HEPARIN SODIUM 5000 UNITS: 5000 INJECTION, SOLUTION INTRAVENOUS; SUBCUTANEOUS at 21:44

## 2025-08-23 RX ADMIN — AZITHROMYCIN MONOHYDRATE 500 MG: 500 INJECTION, POWDER, LYOPHILIZED, FOR SOLUTION INTRAVENOUS at 17:18

## 2025-08-23 RX ADMIN — HEPARIN SODIUM 5000 UNITS: 5000 INJECTION, SOLUTION INTRAVENOUS; SUBCUTANEOUS at 14:16

## 2025-08-23 RX ADMIN — LEVALBUTEROL HYDROCHLORIDE 1.25 MG: 1.25 SOLUTION RESPIRATORY (INHALATION) at 21:58

## 2025-08-23 RX ADMIN — INSULIN LISPRO 4 UNITS: 100 INJECTION, SOLUTION INTRAVENOUS; SUBCUTANEOUS at 21:44

## 2025-08-23 RX ADMIN — LEVALBUTEROL HYDROCHLORIDE 1.25 MG: 1.25 SOLUTION RESPIRATORY (INHALATION) at 05:49

## 2025-08-23 RX ADMIN — FUROSEMIDE 40 MG: 10 INJECTION, SOLUTION INTRAMUSCULAR; INTRAVENOUS at 09:17

## 2025-08-23 RX ADMIN — CEFTRIAXONE SODIUM 2000 MG: 2 INJECTION, POWDER, FOR SOLUTION INTRAMUSCULAR; INTRAVENOUS at 16:41

## 2025-08-23 RX ADMIN — HEPARIN SODIUM 5000 UNITS: 5000 INJECTION, SOLUTION INTRAVENOUS; SUBCUTANEOUS at 05:38

## 2025-08-23 RX ADMIN — SODIUM CHLORIDE, PRESERVATIVE FREE 10 ML: 5 INJECTION INTRAVENOUS at 21:45

## 2025-08-23 RX ADMIN — ATORVASTATIN CALCIUM 10 MG: 10 TABLET, FILM COATED ORAL at 08:09

## 2025-08-23 RX ADMIN — INSULIN LISPRO 6 UNITS: 100 INJECTION, SOLUTION INTRAVENOUS; SUBCUTANEOUS at 16:35

## 2025-08-23 RX ADMIN — SODIUM CHLORIDE, PRESERVATIVE FREE 10 ML: 5 INJECTION INTRAVENOUS at 08:13

## 2025-08-23 RX ADMIN — PREDNISONE 40 MG: 20 TABLET ORAL at 09:18

## 2025-08-23 RX ADMIN — LEVOTHYROXINE SODIUM 50 MCG: 0.05 TABLET ORAL at 05:38

## 2025-08-23 RX ADMIN — INSULIN LISPRO 6 UNITS: 100 INJECTION, SOLUTION INTRAVENOUS; SUBCUTANEOUS at 12:48

## 2025-08-23 RX ADMIN — INSULIN LISPRO 1 UNITS: 100 INJECTION, SOLUTION INTRAVENOUS; SUBCUTANEOUS at 08:09

## 2025-08-23 RX ADMIN — INSULIN GLARGINE 10 UNITS: 100 INJECTION, SOLUTION SUBCUTANEOUS at 21:44

## 2025-08-23 ASSESSMENT — PAIN SCALES - GENERAL: PAINLEVEL_OUTOF10: 0

## 2025-08-24 LAB
ANION GAP SERPL CALCULATED.3IONS-SCNC: 12 MMOL/L (ref 9–16)
BASOPHILS # BLD: 0 K/UL (ref 0–0.2)
BASOPHILS NFR BLD: 0 % (ref 0–2)
BUN SERPL-MCNC: 28 MG/DL (ref 8–23)
CALCIUM SERPL-MCNC: 8.7 MG/DL (ref 8.6–10.4)
CHLORIDE SERPL-SCNC: 104 MMOL/L (ref 98–107)
CO2 SERPL-SCNC: 26 MMOL/L (ref 20–31)
CREAT SERPL-MCNC: 1.1 MG/DL (ref 0.7–1.2)
EOSINOPHIL # BLD: 0 K/UL (ref 0–0.44)
EOSINOPHILS RELATIVE PERCENT: 0 % (ref 0–4)
ERYTHROCYTE [DISTWIDTH] IN BLOOD BY AUTOMATED COUNT: 12.8 % (ref 11.5–14.9)
GFR, ESTIMATED: 49 ML/MIN/1.73M2
GLUCOSE BLD-MCNC: 176 MG/DL (ref 65–105)
GLUCOSE BLD-MCNC: 232 MG/DL (ref 65–105)
GLUCOSE BLD-MCNC: 285 MG/DL (ref 65–105)
GLUCOSE BLD-MCNC: 361 MG/DL (ref 65–105)
GLUCOSE BLD-MCNC: 363 MG/DL (ref 65–105)
GLUCOSE SERPL-MCNC: 202 MG/DL (ref 74–99)
HCT VFR BLD AUTO: 36.7 % (ref 36–46)
HGB BLD-MCNC: 12.4 G/DL (ref 12–16)
IMM GRANULOCYTES # BLD AUTO: 0.1 K/UL (ref 0–0.3)
IMM GRANULOCYTES NFR BLD: 1 %
LYMPHOCYTES NFR BLD: 0.51 K/UL (ref 1.1–3.7)
LYMPHOCYTES RELATIVE PERCENT: 5 % (ref 24–44)
MCH RBC QN AUTO: 31.4 PG (ref 26–34)
MCHC RBC AUTO-ENTMCNC: 33.8 G/DL (ref 31–37)
MCV RBC AUTO: 92.9 FL (ref 80–100)
MONOCYTES NFR BLD: 0.61 K/UL (ref 0.1–1.2)
MONOCYTES NFR BLD: 6 % (ref 3–12)
MORPHOLOGY: NORMAL
NEUTROPHILS NFR BLD: 88 % (ref 36–66)
NEUTS SEG NFR BLD: 8.88 K/UL (ref 1.5–8.1)
NRBC BLD-RTO: 0 PER 100 WBC
PLATELET # BLD AUTO: 275 K/UL (ref 150–450)
PMV BLD AUTO: 9.5 FL (ref 8–13.5)
POTASSIUM SERPL-SCNC: 3.6 MMOL/L (ref 3.7–5.3)
RBC # BLD AUTO: 3.95 M/UL (ref 3.95–5.11)
SODIUM SERPL-SCNC: 142 MMOL/L (ref 136–145)
WBC OTHER # BLD: 10.1 K/UL (ref 3.5–11)

## 2025-08-24 PROCEDURE — 36415 COLL VENOUS BLD VENIPUNCTURE: CPT

## 2025-08-24 PROCEDURE — 2700000000 HC OXYGEN THERAPY PER DAY

## 2025-08-24 PROCEDURE — 2580000003 HC RX 258: Performed by: NURSE PRACTITIONER

## 2025-08-24 PROCEDURE — 97110 THERAPEUTIC EXERCISES: CPT

## 2025-08-24 PROCEDURE — 99233 SBSQ HOSP IP/OBS HIGH 50: CPT | Performed by: INTERNAL MEDICINE

## 2025-08-24 PROCEDURE — 2500000003 HC RX 250 WO HCPCS

## 2025-08-24 PROCEDURE — 94640 AIRWAY INHALATION TREATMENT: CPT

## 2025-08-24 PROCEDURE — 6360000002 HC RX W HCPCS

## 2025-08-24 PROCEDURE — 80048 BASIC METABOLIC PNL TOTAL CA: CPT

## 2025-08-24 PROCEDURE — 6360000002 HC RX W HCPCS: Performed by: NURSE PRACTITIONER

## 2025-08-24 PROCEDURE — 94761 N-INVAS EAR/PLS OXIMETRY MLT: CPT

## 2025-08-24 PROCEDURE — 6370000000 HC RX 637 (ALT 250 FOR IP)

## 2025-08-24 PROCEDURE — 94760 N-INVAS EAR/PLS OXIMETRY 1: CPT

## 2025-08-24 PROCEDURE — 94669 MECHANICAL CHEST WALL OSCILL: CPT

## 2025-08-24 PROCEDURE — 82947 ASSAY GLUCOSE BLOOD QUANT: CPT

## 2025-08-24 PROCEDURE — 99232 SBSQ HOSP IP/OBS MODERATE 35: CPT | Performed by: NURSE PRACTITIONER

## 2025-08-24 PROCEDURE — 2060000000 HC ICU INTERMEDIATE R&B

## 2025-08-24 PROCEDURE — 85025 COMPLETE CBC W/AUTO DIFF WBC: CPT

## 2025-08-24 RX ORDER — BUDESONIDE 0.25 MG/2ML
0.25 INHALANT ORAL 2 TIMES DAILY
Status: DISCONTINUED | OUTPATIENT
Start: 2025-08-24 | End: 2025-08-26 | Stop reason: HOSPADM

## 2025-08-24 RX ADMIN — FUROSEMIDE 40 MG: 10 INJECTION, SOLUTION INTRAMUSCULAR; INTRAVENOUS at 16:52

## 2025-08-24 RX ADMIN — AZITHROMYCIN MONOHYDRATE 500 MG: 500 INJECTION, POWDER, LYOPHILIZED, FOR SOLUTION INTRAVENOUS at 16:49

## 2025-08-24 RX ADMIN — HEPARIN SODIUM 5000 UNITS: 5000 INJECTION, SOLUTION INTRAVENOUS; SUBCUTANEOUS at 23:53

## 2025-08-24 RX ADMIN — INSULIN LISPRO 2 UNITS: 100 INJECTION, SOLUTION INTRAVENOUS; SUBCUTANEOUS at 12:35

## 2025-08-24 RX ADMIN — HEPARIN SODIUM 5000 UNITS: 5000 INJECTION, SOLUTION INTRAVENOUS; SUBCUTANEOUS at 05:05

## 2025-08-24 RX ADMIN — INSULIN LISPRO 8 UNITS: 100 INJECTION, SOLUTION INTRAVENOUS; SUBCUTANEOUS at 20:16

## 2025-08-24 RX ADMIN — HEPARIN SODIUM 5000 UNITS: 5000 INJECTION, SOLUTION INTRAVENOUS; SUBCUTANEOUS at 13:43

## 2025-08-24 RX ADMIN — ATORVASTATIN CALCIUM 10 MG: 10 TABLET, FILM COATED ORAL at 08:24

## 2025-08-24 RX ADMIN — SODIUM CHLORIDE, PRESERVATIVE FREE 10 ML: 5 INJECTION INTRAVENOUS at 10:58

## 2025-08-24 RX ADMIN — INSULIN GLARGINE 10 UNITS: 100 INJECTION, SOLUTION SUBCUTANEOUS at 20:16

## 2025-08-24 RX ADMIN — INSULIN LISPRO 8 UNITS: 100 INJECTION, SOLUTION INTRAVENOUS; SUBCUTANEOUS at 16:42

## 2025-08-24 RX ADMIN — PREDNISONE 40 MG: 20 TABLET ORAL at 08:24

## 2025-08-24 RX ADMIN — CEFTRIAXONE SODIUM 2000 MG: 2 INJECTION, POWDER, FOR SOLUTION INTRAMUSCULAR; INTRAVENOUS at 16:14

## 2025-08-24 RX ADMIN — LEVALBUTEROL HYDROCHLORIDE 1.25 MG: 1.25 SOLUTION RESPIRATORY (INHALATION) at 19:32

## 2025-08-24 RX ADMIN — FUROSEMIDE 40 MG: 10 INJECTION, SOLUTION INTRAMUSCULAR; INTRAVENOUS at 10:58

## 2025-08-24 RX ADMIN — BUDESONIDE 250 MCG: 0.25 SUSPENSION RESPIRATORY (INHALATION) at 14:47

## 2025-08-24 RX ADMIN — SODIUM CHLORIDE, PRESERVATIVE FREE 10 ML: 5 INJECTION INTRAVENOUS at 20:17

## 2025-08-24 RX ADMIN — LEVALBUTEROL HYDROCHLORIDE 1.25 MG: 1.25 SOLUTION RESPIRATORY (INHALATION) at 05:21

## 2025-08-24 RX ADMIN — LEVOTHYROXINE SODIUM 50 MCG: 0.05 TABLET ORAL at 05:05

## 2025-08-24 ASSESSMENT — PAIN SCALES - GENERAL: PAINLEVEL_OUTOF10: 0

## 2025-08-25 LAB
ANION GAP SERPL CALCULATED.3IONS-SCNC: 11 MMOL/L (ref 9–16)
BASOPHILS # BLD: 0.08 K/UL (ref 0–0.2)
BASOPHILS NFR BLD: 1 % (ref 0–2)
BUN SERPL-MCNC: 30 MG/DL (ref 8–23)
CALCIUM SERPL-MCNC: 8.6 MG/DL (ref 8.6–10.4)
CHLORIDE SERPL-SCNC: 101 MMOL/L (ref 98–107)
CO2 SERPL-SCNC: 28 MMOL/L (ref 20–31)
CREAT SERPL-MCNC: 1.1 MG/DL (ref 0.7–1.2)
EOSINOPHIL # BLD: <0.03 K/UL (ref 0–0.44)
EOSINOPHILS RELATIVE PERCENT: 0 % (ref 0–4)
ERYTHROCYTE [DISTWIDTH] IN BLOOD BY AUTOMATED COUNT: 13.1 % (ref 11.5–14.9)
GFR, ESTIMATED: 49 ML/MIN/1.73M2
GLUCOSE BLD-MCNC: 138 MG/DL (ref 65–105)
GLUCOSE BLD-MCNC: 186 MG/DL (ref 65–105)
GLUCOSE BLD-MCNC: 202 MG/DL (ref 65–105)
GLUCOSE BLD-MCNC: 229 MG/DL (ref 65–105)
GLUCOSE BLD-MCNC: 376 MG/DL (ref 65–105)
GLUCOSE BLD-MCNC: 401 MG/DL (ref 65–105)
GLUCOSE SERPL-MCNC: 140 MG/DL (ref 74–99)
HCT VFR BLD AUTO: 39.3 % (ref 36–46)
HGB BLD-MCNC: 13.1 G/DL (ref 12–16)
IMM GRANULOCYTES # BLD AUTO: 0.48 K/UL (ref 0–0.3)
IMM GRANULOCYTES NFR BLD: 5 %
LYMPHOCYTES NFR BLD: 0.85 K/UL (ref 1.1–3.7)
LYMPHOCYTES RELATIVE PERCENT: 9 % (ref 24–44)
M PNEUMO IGM SER QL IA: 0.03
MAGNESIUM SERPL-MCNC: 2.2 MG/DL (ref 1.6–2.4)
MAGNESIUM SERPL-MCNC: 2.2 MG/DL (ref 1.6–2.4)
MCH RBC QN AUTO: 30.9 PG (ref 26–34)
MCHC RBC AUTO-ENTMCNC: 33.3 G/DL (ref 31–37)
MCV RBC AUTO: 92.7 FL (ref 80–100)
MONOCYTES NFR BLD: 0.72 K/UL (ref 0.1–1.2)
MONOCYTES NFR BLD: 8 % (ref 3–12)
NEUTROPHILS NFR BLD: 77 % (ref 36–66)
NEUTS SEG NFR BLD: 7.43 K/UL (ref 1.5–8.1)
NRBC BLD-RTO: 0 PER 100 WBC
PLATELET # BLD AUTO: 318 K/UL (ref 150–450)
PMV BLD AUTO: 9.8 FL (ref 8–13.5)
POTASSIUM SERPL-SCNC: 3.4 MMOL/L (ref 3.7–5.3)
POTASSIUM SERPL-SCNC: 4.4 MMOL/L (ref 3.7–5.3)
RBC # BLD AUTO: 4.24 M/UL (ref 3.95–5.11)
SODIUM SERPL-SCNC: 140 MMOL/L (ref 136–145)
WBC OTHER # BLD: 9.6 K/UL (ref 3.5–11)

## 2025-08-25 PROCEDURE — 6360000002 HC RX W HCPCS: Performed by: NURSE PRACTITIONER

## 2025-08-25 PROCEDURE — G0545 PR INHERENT VISIT TO INPT: HCPCS | Performed by: NURSE PRACTITIONER

## 2025-08-25 PROCEDURE — 6370000000 HC RX 637 (ALT 250 FOR IP)

## 2025-08-25 PROCEDURE — 36410 VNPNXR 3YR/> PHY/QHP DX/THER: CPT

## 2025-08-25 PROCEDURE — 83735 ASSAY OF MAGNESIUM: CPT

## 2025-08-25 PROCEDURE — 97530 THERAPEUTIC ACTIVITIES: CPT

## 2025-08-25 PROCEDURE — 85025 COMPLETE CBC W/AUTO DIFF WBC: CPT

## 2025-08-25 PROCEDURE — 82947 ASSAY GLUCOSE BLOOD QUANT: CPT

## 2025-08-25 PROCEDURE — 94760 N-INVAS EAR/PLS OXIMETRY 1: CPT

## 2025-08-25 PROCEDURE — 6360000002 HC RX W HCPCS

## 2025-08-25 PROCEDURE — 84132 ASSAY OF SERUM POTASSIUM: CPT

## 2025-08-25 PROCEDURE — 36415 COLL VENOUS BLD VENIPUNCTURE: CPT

## 2025-08-25 PROCEDURE — 94640 AIRWAY INHALATION TREATMENT: CPT

## 2025-08-25 PROCEDURE — 99232 SBSQ HOSP IP/OBS MODERATE 35: CPT | Performed by: NURSE PRACTITIONER

## 2025-08-25 PROCEDURE — 99233 SBSQ HOSP IP/OBS HIGH 50: CPT | Performed by: INTERNAL MEDICINE

## 2025-08-25 PROCEDURE — 2700000000 HC OXYGEN THERAPY PER DAY

## 2025-08-25 PROCEDURE — 05HC33Z INSERTION OF INFUSION DEVICE INTO LEFT BASILIC VEIN, PERCUTANEOUS APPROACH: ICD-10-PCS | Performed by: INTERNAL MEDICINE

## 2025-08-25 PROCEDURE — 2580000003 HC RX 258: Performed by: NURSE PRACTITIONER

## 2025-08-25 PROCEDURE — 97116 GAIT TRAINING THERAPY: CPT

## 2025-08-25 PROCEDURE — 93005 ELECTROCARDIOGRAM TRACING: CPT

## 2025-08-25 PROCEDURE — 76937 US GUIDE VASCULAR ACCESS: CPT

## 2025-08-25 PROCEDURE — 80048 BASIC METABOLIC PNL TOTAL CA: CPT

## 2025-08-25 PROCEDURE — 2060000000 HC ICU INTERMEDIATE R&B

## 2025-08-25 PROCEDURE — C1751 CATH, INF, PER/CENT/MIDLINE: HCPCS

## 2025-08-25 PROCEDURE — 2500000003 HC RX 250 WO HCPCS

## 2025-08-25 RX ORDER — PREDNISONE 20 MG/1
40 TABLET ORAL DAILY
Qty: 4 TABLET | Refills: 0 | Status: SHIPPED | OUTPATIENT
Start: 2025-08-26 | End: 2025-08-28

## 2025-08-25 RX ORDER — LEVALBUTEROL INHALATION SOLUTION 1.25 MG/3ML
1.25 SOLUTION RESPIRATORY (INHALATION) EVERY 6 HOURS PRN
Qty: 3 ML | Refills: 0 | Status: SHIPPED | OUTPATIENT
Start: 2025-08-25

## 2025-08-25 RX ORDER — BUDESONIDE 0.25 MG/2ML
250 INHALANT ORAL 2 TIMES DAILY
Qty: 60 EACH | Refills: 3 | Status: SHIPPED | OUTPATIENT
Start: 2025-08-25

## 2025-08-25 RX ORDER — ATORVASTATIN CALCIUM 10 MG/1
10 TABLET, FILM COATED ORAL DAILY
Qty: 30 TABLET | Refills: 3 | Status: SHIPPED | OUTPATIENT
Start: 2025-08-26

## 2025-08-25 RX ORDER — GUAIFENESIN 200 MG/10ML
200 LIQUID ORAL EVERY 4 HOURS PRN
Status: DISCONTINUED | OUTPATIENT
Start: 2025-08-25 | End: 2025-08-26 | Stop reason: HOSPADM

## 2025-08-25 RX ORDER — LEVOTHYROXINE SODIUM 50 UG/1
50 TABLET ORAL DAILY
Qty: 30 TABLET | Refills: 3 | Status: SHIPPED | OUTPATIENT
Start: 2025-08-26

## 2025-08-25 RX ORDER — POTASSIUM CHLORIDE 1500 MG/1
40 TABLET, EXTENDED RELEASE ORAL PRN
Status: DISCONTINUED | OUTPATIENT
Start: 2025-08-25 | End: 2025-08-26 | Stop reason: HOSPADM

## 2025-08-25 RX ORDER — INSULIN GLARGINE 100 [IU]/ML
10 INJECTION, SOLUTION SUBCUTANEOUS NIGHTLY
Qty: 10 ML | Refills: 3 | Status: CANCELLED | OUTPATIENT
Start: 2025-08-25

## 2025-08-25 RX ORDER — LIDOCAINE HYDROCHLORIDE 10 MG/ML
50 INJECTION, SOLUTION EPIDURAL; INFILTRATION; INTRACAUDAL; PERINEURAL ONCE
Status: DISCONTINUED | OUTPATIENT
Start: 2025-08-25 | End: 2025-08-26 | Stop reason: HOSPADM

## 2025-08-25 RX ORDER — POTASSIUM CHLORIDE 1500 MG/1
40 TABLET, EXTENDED RELEASE ORAL ONCE
Status: DISCONTINUED | OUTPATIENT
Start: 2025-08-25 | End: 2025-08-25 | Stop reason: SDUPTHER

## 2025-08-25 RX ORDER — SODIUM CHLORIDE 9 MG/ML
INJECTION, SOLUTION INTRAVENOUS PRN
Status: DISCONTINUED | OUTPATIENT
Start: 2025-08-25 | End: 2025-08-26 | Stop reason: HOSPADM

## 2025-08-25 RX ORDER — SODIUM CHLORIDE 0.9 % (FLUSH) 0.9 %
5-40 SYRINGE (ML) INJECTION EVERY 12 HOURS SCHEDULED
Status: DISCONTINUED | OUTPATIENT
Start: 2025-08-25 | End: 2025-08-26 | Stop reason: HOSPADM

## 2025-08-25 RX ORDER — POTASSIUM CHLORIDE 7.45 MG/ML
10 INJECTION INTRAVENOUS PRN
Status: DISCONTINUED | OUTPATIENT
Start: 2025-08-25 | End: 2025-08-26 | Stop reason: HOSPADM

## 2025-08-25 RX ORDER — SODIUM CHLORIDE 0.9 % (FLUSH) 0.9 %
5-40 SYRINGE (ML) INJECTION PRN
Status: DISCONTINUED | OUTPATIENT
Start: 2025-08-25 | End: 2025-08-26 | Stop reason: HOSPADM

## 2025-08-25 RX ORDER — INSULIN LISPRO 100 [IU]/ML
0-8 INJECTION, SOLUTION INTRAVENOUS; SUBCUTANEOUS
Status: CANCELLED | OUTPATIENT
Start: 2025-08-25

## 2025-08-25 RX ADMIN — BUDESONIDE 250 MCG: 0.25 SUSPENSION RESPIRATORY (INHALATION) at 07:00

## 2025-08-25 RX ADMIN — INSULIN GLARGINE 10 UNITS: 100 INJECTION, SOLUTION SUBCUTANEOUS at 20:06

## 2025-08-25 RX ADMIN — FUROSEMIDE 40 MG: 10 INJECTION, SOLUTION INTRAMUSCULAR; INTRAVENOUS at 10:49

## 2025-08-25 RX ADMIN — INSULIN LISPRO 8 UNITS: 100 INJECTION, SOLUTION INTRAVENOUS; SUBCUTANEOUS at 18:52

## 2025-08-25 RX ADMIN — POTASSIUM CHLORIDE 40 MEQ: 1500 TABLET, EXTENDED RELEASE ORAL at 14:04

## 2025-08-25 RX ADMIN — LEVOTHYROXINE SODIUM 50 MCG: 0.05 TABLET ORAL at 05:38

## 2025-08-25 RX ADMIN — INSULIN LISPRO 8 UNITS: 100 INJECTION, SOLUTION INTRAVENOUS; SUBCUTANEOUS at 20:06

## 2025-08-25 RX ADMIN — BUDESONIDE 250 MCG: 0.25 SUSPENSION RESPIRATORY (INHALATION) at 20:46

## 2025-08-25 RX ADMIN — ATORVASTATIN CALCIUM 10 MG: 10 TABLET, FILM COATED ORAL at 10:57

## 2025-08-25 RX ADMIN — LEVALBUTEROL HYDROCHLORIDE 1.25 MG: 1.25 SOLUTION RESPIRATORY (INHALATION) at 05:45

## 2025-08-25 RX ADMIN — SODIUM CHLORIDE, PRESERVATIVE FREE 10 ML: 5 INJECTION INTRAVENOUS at 10:49

## 2025-08-25 RX ADMIN — CEFTRIAXONE SODIUM 2000 MG: 2 INJECTION, POWDER, FOR SOLUTION INTRAMUSCULAR; INTRAVENOUS at 18:57

## 2025-08-25 RX ADMIN — HEPARIN SODIUM 5000 UNITS: 5000 INJECTION, SOLUTION INTRAVENOUS; SUBCUTANEOUS at 05:38

## 2025-08-25 RX ADMIN — INSULIN LISPRO 2 UNITS: 100 INJECTION, SOLUTION INTRAVENOUS; SUBCUTANEOUS at 12:27

## 2025-08-25 RX ADMIN — PREDNISONE 40 MG: 20 TABLET ORAL at 10:49

## 2025-08-25 RX ADMIN — AZITHROMYCIN MONOHYDRATE 500 MG: 500 INJECTION, POWDER, LYOPHILIZED, FOR SOLUTION INTRAVENOUS at 20:00

## 2025-08-25 RX ADMIN — HEPARIN SODIUM 5000 UNITS: 5000 INJECTION, SOLUTION INTRAVENOUS; SUBCUTANEOUS at 21:10

## 2025-08-25 ASSESSMENT — ENCOUNTER SYMPTOMS
COUGH: 0
VOMITING: 0
WHEEZING: 0
NAUSEA: 0
SHORTNESS OF BREATH: 1
GASTROINTESTINAL NEGATIVE: 1
WHEEZING: 1
ABDOMINAL PAIN: 0

## 2025-08-26 VITALS
BODY MASS INDEX: 32.94 KG/M2 | SYSTOLIC BLOOD PRESSURE: 143 MMHG | HEIGHT: 62 IN | OXYGEN SATURATION: 95 % | WEIGHT: 179 LBS | TEMPERATURE: 97.5 F | RESPIRATION RATE: 18 BRPM | DIASTOLIC BLOOD PRESSURE: 69 MMHG | HEART RATE: 93 BPM

## 2025-08-26 LAB
ANION GAP SERPL CALCULATED.3IONS-SCNC: 11 MMOL/L (ref 9–16)
BASOPHILS # BLD: 0 K/UL (ref 0–0.2)
BASOPHILS NFR BLD: 0 % (ref 0–2)
BUN SERPL-MCNC: 29 MG/DL (ref 8–23)
CALCIUM SERPL-MCNC: 8.3 MG/DL (ref 8.6–10.4)
CHLORIDE SERPL-SCNC: 105 MMOL/L (ref 98–107)
CO2 SERPL-SCNC: 25 MMOL/L (ref 20–31)
CREAT SERPL-MCNC: 1.1 MG/DL (ref 0.7–1.2)
EKG ATRIAL RATE: 102 BPM
EKG P AXIS: 60 DEGREES
EKG P-R INTERVAL: 156 MS
EKG Q-T INTERVAL: 372 MS
EKG QRS DURATION: 136 MS
EKG QTC CALCULATION (BAZETT): 484 MS
EKG R AXIS: -60 DEGREES
EKG T AXIS: 33 DEGREES
EKG VENTRICULAR RATE: 102 BPM
EOSINOPHIL # BLD: 0 K/UL (ref 0–0.4)
EOSINOPHILS RELATIVE PERCENT: 0 % (ref 0–4)
ERYTHROCYTE [DISTWIDTH] IN BLOOD BY AUTOMATED COUNT: 13.2 % (ref 11.5–14.9)
GFR, ESTIMATED: 49 ML/MIN/1.73M2
GLUCOSE BLD-MCNC: 104 MG/DL (ref 65–105)
GLUCOSE BLD-MCNC: 297 MG/DL (ref 65–105)
GLUCOSE SERPL-MCNC: 105 MG/DL (ref 74–99)
HCT VFR BLD AUTO: 39.5 % (ref 36–46)
HGB BLD-MCNC: 12.7 G/DL (ref 12–16)
IMM GRANULOCYTES # BLD AUTO: 0.32 K/UL (ref 0–0.3)
IMM GRANULOCYTES NFR BLD: 3 %
LYMPHOCYTES NFR BLD: 1.61 K/UL (ref 1–4.8)
LYMPHOCYTES RELATIVE PERCENT: 15 % (ref 24–44)
MCH RBC QN AUTO: 32 PG (ref 26–34)
MCHC RBC AUTO-ENTMCNC: 32.2 G/DL (ref 31–37)
MCV RBC AUTO: 99.5 FL (ref 80–100)
MONOCYTES NFR BLD: 0.64 K/UL (ref 0.1–1.3)
MONOCYTES NFR BLD: 6 % (ref 1–7)
MORPHOLOGY: NORMAL
NEUTROPHILS NFR BLD: 76 % (ref 36–66)
NEUTS SEG NFR BLD: 8.13 K/UL (ref 1.3–9.1)
NRBC BLD-RTO: 0 PER 100 WBC
PLATELET # BLD AUTO: 271 K/UL (ref 150–450)
PMV BLD AUTO: 9.9 FL (ref 8–13.5)
POTASSIUM SERPL-SCNC: 4.2 MMOL/L (ref 3.7–5.3)
RBC # BLD AUTO: 3.97 M/UL (ref 3.95–5.11)
SODIUM SERPL-SCNC: 141 MMOL/L (ref 136–145)
WBC OTHER # BLD: 10.7 K/UL (ref 3.5–11)

## 2025-08-26 PROCEDURE — 99239 HOSP IP/OBS DSCHRG MGMT >30: CPT | Performed by: INTERNAL MEDICINE

## 2025-08-26 PROCEDURE — 36415 COLL VENOUS BLD VENIPUNCTURE: CPT

## 2025-08-26 PROCEDURE — 82947 ASSAY GLUCOSE BLOOD QUANT: CPT

## 2025-08-26 PROCEDURE — 97535 SELF CARE MNGMENT TRAINING: CPT

## 2025-08-26 PROCEDURE — 99221 1ST HOSP IP/OBS SF/LOW 40: CPT | Performed by: INTERNAL MEDICINE

## 2025-08-26 PROCEDURE — 6360000002 HC RX W HCPCS

## 2025-08-26 PROCEDURE — 85025 COMPLETE CBC W/AUTO DIFF WBC: CPT

## 2025-08-26 PROCEDURE — 97530 THERAPEUTIC ACTIVITIES: CPT

## 2025-08-26 PROCEDURE — 6370000000 HC RX 637 (ALT 250 FOR IP)

## 2025-08-26 PROCEDURE — 80048 BASIC METABOLIC PNL TOTAL CA: CPT

## 2025-08-26 PROCEDURE — 2500000003 HC RX 250 WO HCPCS

## 2025-08-26 PROCEDURE — 2500000003 HC RX 250 WO HCPCS: Performed by: NURSE PRACTITIONER

## 2025-08-26 PROCEDURE — 97110 THERAPEUTIC EXERCISES: CPT

## 2025-08-26 PROCEDURE — 97116 GAIT TRAINING THERAPY: CPT

## 2025-08-26 PROCEDURE — G0545 PR INHERENT VISIT TO INPT: HCPCS | Performed by: NURSE PRACTITIONER

## 2025-08-26 PROCEDURE — 2700000000 HC OXYGEN THERAPY PER DAY

## 2025-08-26 PROCEDURE — 94640 AIRWAY INHALATION TREATMENT: CPT

## 2025-08-26 PROCEDURE — 94761 N-INVAS EAR/PLS OXIMETRY MLT: CPT

## 2025-08-26 PROCEDURE — 93010 ELECTROCARDIOGRAM REPORT: CPT | Performed by: INTERNAL MEDICINE

## 2025-08-26 PROCEDURE — 6360000002 HC RX W HCPCS: Performed by: NURSE PRACTITIONER

## 2025-08-26 PROCEDURE — 99232 SBSQ HOSP IP/OBS MODERATE 35: CPT | Performed by: NURSE PRACTITIONER

## 2025-08-26 RX ORDER — METOPROLOL TARTRATE 25 MG/1
25 TABLET, FILM COATED ORAL 2 TIMES DAILY
Status: DISCONTINUED | OUTPATIENT
Start: 2025-08-26 | End: 2025-08-26 | Stop reason: HOSPADM

## 2025-08-26 RX ORDER — METOPROLOL TARTRATE 25 MG/1
25 TABLET, FILM COATED ORAL 2 TIMES DAILY
Qty: 60 TABLET | Refills: 3 | Status: SHIPPED | OUTPATIENT
Start: 2025-08-26

## 2025-08-26 RX ORDER — METOPROLOL TARTRATE 25 MG/1
12.5 TABLET, FILM COATED ORAL 2 TIMES DAILY
Status: DISCONTINUED | OUTPATIENT
Start: 2025-08-26 | End: 2025-08-26

## 2025-08-26 RX ADMIN — GUAIFENESIN 200 MG: 200 SOLUTION ORAL at 00:09

## 2025-08-26 RX ADMIN — LEVOTHYROXINE SODIUM 50 MCG: 0.05 TABLET ORAL at 05:16

## 2025-08-26 RX ADMIN — TORSEMIDE 40 MG: 20 TABLET ORAL at 07:36

## 2025-08-26 RX ADMIN — LEVALBUTEROL HYDROCHLORIDE 1.25 MG: 1.25 SOLUTION RESPIRATORY (INHALATION) at 06:57

## 2025-08-26 RX ADMIN — LEVALBUTEROL HYDROCHLORIDE 1.25 MG: 1.25 SOLUTION RESPIRATORY (INHALATION) at 04:21

## 2025-08-26 RX ADMIN — HEPARIN SODIUM 5000 UNITS: 5000 INJECTION, SOLUTION INTRAVENOUS; SUBCUTANEOUS at 05:16

## 2025-08-26 RX ADMIN — INSULIN LISPRO 4 UNITS: 100 INJECTION, SOLUTION INTRAVENOUS; SUBCUTANEOUS at 11:24

## 2025-08-26 RX ADMIN — PREDNISONE 40 MG: 20 TABLET ORAL at 07:36

## 2025-08-26 RX ADMIN — ATORVASTATIN CALCIUM 10 MG: 10 TABLET, FILM COATED ORAL at 07:36

## 2025-08-26 RX ADMIN — SODIUM CHLORIDE, PRESERVATIVE FREE 10 ML: 5 INJECTION INTRAVENOUS at 07:37

## 2025-08-26 RX ADMIN — BUDESONIDE 250 MCG: 0.25 SUSPENSION RESPIRATORY (INHALATION) at 06:57

## 2025-08-26 ASSESSMENT — ENCOUNTER SYMPTOMS
COUGH: 0
ABDOMINAL PAIN: 0
WHEEZING: 1
GASTROINTESTINAL NEGATIVE: 1
SHORTNESS OF BREATH: 1
WHEEZING: 0
VOMITING: 0
NAUSEA: 0

## 2025-08-28 LAB
MICROORGANISM SPEC CULT: NORMAL
MICROORGANISM SPEC CULT: NORMAL
SERVICE CMNT-IMP: NORMAL
SERVICE CMNT-IMP: NORMAL
SPECIMEN DESCRIPTION: NORMAL
SPECIMEN DESCRIPTION: NORMAL

## 2025-09-03 LAB
MICROBIOLOGY SEND OUT REPORT: NORMAL
TEST NAME: NORMAL

## (undated) DEVICE — GAUZE,SPONGE,4"X4",16PLY,STRL,LF,10/TRAY: Brand: MEDLINE

## (undated) DEVICE — SYRINGE MED 50ML LUERLOCK TIP

## (undated) DEVICE — JELLY,LUBE,STERILE,FLIP TOP,TUBE,2-OZ: Brand: MEDLINE

## (undated) DEVICE — MEDICINE CUP, GRADUATED, STER: Brand: MEDLINE

## (undated) DEVICE — STAZ ENDO KIT: Brand: MEDLINE INDUSTRIES, INC.

## (undated) DEVICE — ADAPTER TBNG LUER STUB 15 GA INTMED

## (undated) DEVICE — GOWN POLY REINF SONT XLG: Brand: MEDLINE INDUSTRIES, INC.

## (undated) DEVICE — FORCEPS BX L240CM JAW DIA22MM ORNG STD CAP W NDL RAD JAW 4

## (undated) DEVICE — BASIN EMSIS 700ML GRAPHITE PLAS KID SHP GRAD

## (undated) DEVICE — FORCEP BX MESH TOOTH MIC 2.8 MMX240 CM NDL STRL RADIAL JAW 4

## (undated) DEVICE — BITEBLOCK ENDOSCP 60FR MAXI WHT POLYETH STURDY W/ VELC WVN